# Patient Record
Sex: MALE | Race: WHITE | Employment: FULL TIME | URBAN - METROPOLITAN AREA
[De-identification: names, ages, dates, MRNs, and addresses within clinical notes are randomized per-mention and may not be internally consistent; named-entity substitution may affect disease eponyms.]

---

## 2017-02-09 ENCOUNTER — OFFICE VISIT (OUTPATIENT)
Dept: FAMILY MEDICINE CLINIC | Facility: CLINIC | Age: 32
End: 2017-02-09

## 2017-02-09 VITALS
RESPIRATION RATE: 18 BRPM | HEART RATE: 67 BPM | HEIGHT: 78 IN | OXYGEN SATURATION: 99 % | WEIGHT: 175 LBS | SYSTOLIC BLOOD PRESSURE: 110 MMHG | BODY MASS INDEX: 20.25 KG/M2 | TEMPERATURE: 98.2 F | DIASTOLIC BLOOD PRESSURE: 65 MMHG

## 2017-02-09 DIAGNOSIS — J02.9 SORE THROAT: ICD-10-CM

## 2017-02-09 DIAGNOSIS — Z20.818 STREP THROAT EXPOSURE: Primary | ICD-10-CM

## 2017-02-09 LAB
S PYO AG THROAT QL: NEGATIVE
VALID INTERNAL CONTROL?: YES

## 2017-02-09 RX ORDER — AMOXICILLIN 500 MG/1
500 CAPSULE ORAL 2 TIMES DAILY
Qty: 20 CAP | Refills: 0 | Status: SHIPPED | OUTPATIENT
Start: 2017-02-09 | End: 2017-02-19

## 2017-02-09 NOTE — MR AVS SNAPSHOT
Visit Information Date & Time Provider Department Dept. Phone Encounter #  
 2/9/2017  8:45 AM Timur Denise NP [facility] [address] [phone] [id number] Upcoming Health Maintenance Date Due DTaP/Tdap/Td series (1 - Tdap) 9/27/2004 INFLUENZA AGE 9 TO ADULT 8/1/2016 Allergies as of 2/9/2017  Review Complete On: 2/9/2017 By: Timur Denise NP No Known Allergies Current Immunizations  Never Reviewed No immunizations on file. Not reviewed this visit You Were Diagnosed With   
  
 Codes Comments Strep throat exposure    -  Primary ICD-10-CM: Q86.893 ICD-9-CM: V01.89 Sore throat     ICD-10-CM: J02.9 ICD-9-CM: 615 Vitals BP Pulse Temp Resp Height(growth percentile) Weight(growth percentile) 110/65 (BP 1 Location: Left arm, BP Patient Position: Sitting) 67 98.2 °F (36.8 °C) (Oral) 18 6' 6\" (1.981 m) 175 lb (79.4 kg) SpO2 BMI Smoking Status 99% 20.22 kg/m2 Passive Smoke Exposure - Never Smoker BMI and BSA Data Body Mass Index Body Surface Area  
 20.22 kg/m 2 2.09 m 2 Preferred Pharmacy Pharmacy Name Phone [id number]  [facility], [address] [place] [address]  [phone] Your Updated Medication List  
  
   
This list is accurate as of: 2/9/17  9:02 AM.  Always use your most recent med list.  
  
  
  
  
 amoxicillin 500 mg capsule Commonly known as:  AMOXIL Take 1 Cap by mouth two (2) times a day for 10 days. Prescriptions Sent to Pharmacy Refills  
 amoxicillin (AMOXIL) 500 mg capsule 0 Sig: Take 1 Cap by mouth two (2) times a day for 10 days. Class: Normal  
 Pharmacy: [id number], [address] [place] [address]  Ph #: [phone] Route: Oral  
  
We Performed the Following AMB POC RAPID STREP A [93904 CPT(R)] Patient Instructions Sore Throat: Care Instructions Your Care Instructions Infection by bacteria or a virus causes most sore throats. Cigarette smoke, dry air, air pollution, allergies, and yelling can also cause a sore throat. Sore throats can be painful and annoying. Fortunately, most sore throats go away on their own. If you have a bacterial infection, your doctor may prescribe antibiotics. Follow-up care is a key part of your treatment and safety. Be sure to make and go to all appointments, and call your doctor if you are having problems. It's also a good idea to know your test results and keep a list of the medicines you take. How can you care for yourself at home? · If your doctor prescribed antibiotics, take them as directed. Do not stop taking them just because you feel better. You need to take the full course of antibiotics. · Gargle with warm salt water once an hour to help reduce swelling and relieve discomfort. Use 1 teaspoon of salt mixed in 1 cup of warm water. · Take an over-the-counter pain medicine, such as acetaminophen (Tylenol), ibuprofen (Advil, Motrin), or naproxen (Aleve). Read and follow all instructions on the label. · Be careful when taking over-the-counter cold or flu medicines and Tylenol at the same time. Many of these medicines have acetaminophen, which is Tylenol. Read the labels to make sure that you are not taking more than the recommended dose. Too much acetaminophen (Tylenol) can be harmful. · Drink plenty of fluids. Fluids may help soothe an irritated throat. Hot fluids, such as tea or soup, may help decrease throat pain. · Use over-the-counter throat lozenges to soothe pain. Regular cough drops or hard candy may also help. These should not be given to young children because of the risk of choking. · Do not smoke or allow others to smoke around you. If you need help quitting, talk to your doctor about stop-smoking programs and medicines. These can increase your chances of quitting for good. · Use a vaporizer or humidifier to add moisture to your bedroom. Follow the directions for cleaning the machine. When should you call for help? Call your doctor now or seek immediate medical care if: 
· You have new or worse trouble swallowing. · Your sore throat gets much worse on one side. Watch closely for changes in your health, and be sure to contact your doctor if you do not get better as expected. Where can you learn more? Go to http://nicole-prasanth.info/. Enter [id number] in the search box to learn more about \"Sore Throat: Care Instructions. \" Current as of: July 29, 2016 Content Version: 11.1 © 3941-3714 UserApp. Care instructions adapted under license by Thefuture[employer]fm (which disclaims liability or warranty for this information). If you have questions about a medical condition or this instruction, always ask your healthcare professional. [place] [address] any warranty or liability for your use of this information. Sore Throat: Care Instructions Your Care Instructions Infection by bacteria or a virus causes most sore throats. Cigarette smoke, dry air, air pollution, allergies, and yelling can also cause a sore throat. Sore throats can be painful and annoying. Fortunately, most sore throats go away on their own. If you have a bacterial infection, your doctor may prescribe antibiotics. Follow-up care is a key part of your treatment and safety. Be sure to make and go to all appointments, and call your doctor if you are having problems. It's also a good idea to know your test results and keep a list of the medicines you take. How can you care for yourself at home? · If your doctor prescribed antibiotics, take them as directed. Do not stop taking them just because you feel better. You need to take the full course of antibiotics.  
· Gargle with warm salt water once an hour to help reduce swelling and relieve discomfort. Use 1 teaspoon of salt mixed in 1 cup of warm water. · Take an over-the-counter pain medicine, such as acetaminophen (Tylenol), ibuprofen (Advil, Motrin), or naproxen (Aleve). Read and follow all instructions on the label. · Be careful when taking over-the-counter cold or flu medicines and Tylenol at the same time. Many of these medicines have acetaminophen, which is Tylenol. Read the labels to make sure that you are not taking more than the recommended dose. Too much acetaminophen (Tylenol) can be harmful. · Drink plenty of fluids. Fluids may help soothe an irritated throat. Hot fluids, such as tea or soup, may help decrease throat pain. · Use over-the-counter throat lozenges to soothe pain. Regular cough drops or hard candy may also help. These should not be given to young children because of the risk of choking. · Do not smoke or allow others to smoke around you. If you need help quitting, talk to your doctor about stop-smoking programs and medicines. These can increase your chances of quitting for good. · Use a vaporizer or humidifier to add moisture to your bedroom. Follow the directions for cleaning the machine. When should you call for help? Call your doctor now or seek immediate medical care if: 
· You have new or worse trouble swallowing. · Your sore throat gets much worse on one side. Watch closely for changes in your health, and be sure to contact your doctor if you do not get better as expected. Where can you learn more? Go to http://nicole-prasanth.info/. Enter [id number] in the search box to learn more about \"Sore Throat: Care Instructions. \" Current as of: July 29, 2016 Content Version: 11.1 © 9288-9162 Paltalk. Care instructions adapted under license by [employer] (which disclaims liability or warranty for this information).  If you have questions about a medical condition or this instruction, always ask your healthcare professional. [address] any warranty or liability for your use of this information. Rapid Strep Test: About This Test 
What is it? A rapid strep test checks the bacteria in your throat to see if strep is the cause of your sore throat. Why is this test done? It may be done so your doctor can find out right away whether you have strep throat. There is another test for strep, called a throat culture, but that test takes a few days to get the results. How can you prepare for the test? 
You don't need to do anything before you have this test. 
What happens during the test? 
· You will be asked to tilt your head back and open your mouth as wide as possible. · Your doctor will press your tongue down with a flat stick (tongue depressor) and then examine your mouth and throat. · A clean cotton swab will be rubbed over the back of your throat, around your tonsils, and over any red areas or sores to collect a sample. How long does the test take? · The test takes less than a minute. · Results are available in 10 to 15 minutes. When should you call for help? Call your doctor now or seek immediate medical care if: 
· Your pain gets worse on one side of your throat, or you have trouble opening your mouth. · You have a new or higher fever, or you have a fever with a stiff neck or severe headache. · Swallowing becomes harder, or you have any trouble breathing. · You are sensitive to light or feel very sleepy or confused. Watch closely for changes in your health, and be sure to contact your doctor if: 
· You do not start to feel better after 2 days. Follow-up care is a key part of your treatment and safety. Be sure to make and go to all appointments, and call your doctor if you are having problems. It's also a good idea to keep a list of the medicines you take. Ask your doctor when you can expect to have your test results. Where can you learn more? Go to http://nicole-prasanth.info/. Enter B356 in the search box to learn more about \"Rapid Strep Test: About This Test.\" Current as of: July 29, 2016 Content Version: 11.1 © 4001-3591 untapt, Incorporated. Care instructions adapted under license by [employer] (which disclaims liability or warranty for this information). If you have questions about a medical condition or this instruction, always ask your healthcare professional. [address] any warranty or liability for your use of this information. Introducing [facility]! Dear Ela Shanks: 
Thank you for requesting a [employer] account. Our records indicate that you already have an active [employer] account. You can access your account anytime at [email or web address] Did you know that you can access your hospital and ER discharge instructions at any time in [employer]? You can also review all of your test results from your hospital stay or ER visit. Additional Information If you have questions, please visit the Frequently Asked Questions section of the [employer] website at [email or web address] Remember, [employer] is NOT to be used for urgent needs. For medical emergencies, dial 911. Now available from your iPhone and Android! Please provide this summary of care documentation to your next provider. Your primary care clinician is listed as Golden Stern. If you have any questions after today's visit, please call [phone].

## 2017-02-09 NOTE — PROGRESS NOTES
Chief Complaint   Patient presents with    Sore Throat     sore throat for one day. Daughter is being treatedfor strep throat. Subjective fective. HISTORY OF PRESENT ILLNESS  Vincenzo Marshall is a 33 y.o. male who presents with sore throat for one day and exposure to sore throat . Sore Throat    The history is provided by the patient. This is a new problem. The current episode started yesterday. The problem has been gradually worsening. Patient reports a subjective fever - was not measured. Pertinent negatives include no congestion, no ear discharge, no ear pain, no headaches, no plugged ear sensation, no shortness of breath, no swollen glands, no trouble swallowing and no cough. He has had exposure to strep. He has had no exposure to mono. He has tried nothing for the symptoms. Review of Systems   Constitutional: Negative for chills and fever. HENT: Positive for sore throat. Negative for congestion, ear discharge, ear pain and trouble swallowing. Eyes: Negative for discharge and redness. Respiratory: Negative for cough and shortness of breath. Cardiovascular: Negative for chest pain. Neurological: Negative for headaches. Past Medical History   Diagnosis Date    Clavicle fracture     Insomnia     Pain, abdominal, nonspecific      Dr. Ki Pacheco.  recurrent pain, diarrhea, nausea.    colon 6/27/12    Seasonal allergies      hx allergy shot    Vertigo      Family History   Problem Relation Age of Onset    Crohn's Disease Brother     Cancer Neg Hx     Diabetes Neg Hx     Heart Disease Neg Hx     High Cholesterol Neg Hx     Stroke Neg Hx      Social History     Social History    Marital status: SINGLE     Spouse name:  Srini Owens    Number of children: N/A    Years of education: N/A     Occupational History    [employer]      Social History Main Topics    Smoking status: Passive Smoke Exposure - Never Smoker     Types: Cigarettes    Smokeless tobacco: Never Used Comment: Typically 2 cigarettes    Alcohol use 5.0 oz/week     10 Standard drinks or equivalent per week      Comment: occasionally    Drug use: Not on file    Sexual activity: Yes     Partners: Female     Birth control/ protection: Pill     Other Topics Concern    Not on file     Social History Narrative       Current Outpatient Prescriptions:     amoxicillin (AMOXIL) 500 mg capsule, Take 1 Cap by mouth two (2) times a day for 10 days. , Disp: 20 Cap, Rfl: 0    Objective:   Visit Vitals    /65 (BP 1 Location: Left arm, BP Patient Position: Sitting)    Pulse 67    Temp 98.2 °F (36.8 °C) (Oral)    Resp 18    Ht 6' 6\" (1.981 m)    Wt 175 lb (79.4 kg)    SpO2 99%    BMI 20.22 kg/m2     Physical Exam   Constitutional: He is oriented to person, place, and time. He appears well-developed and well-nourished. HENT:   Head: Normocephalic and atraumatic. Right Ear: External ear normal.   Left Ear: External ear normal.   Nose: Nose normal.   Mouth/Throat: Oropharynx is clear and moist. No oropharyngeal exudate. Eyes: Conjunctivae are normal.   Cardiovascular: Normal rate, regular rhythm and normal heart sounds. Pulmonary/Chest: Effort normal and breath sounds normal. No respiratory distress. Lymphadenopathy:     He has no cervical adenopathy. Neurological: He is alert and oriented to person, place, and time. Skin: Skin is warm and dry. Psychiatric: He has a normal mood and affect. ASSESSMENT and PLAN    ICD-10-CM ICD-9-CM    1. Strep throat exposure Z20.818 V01.89 AMB POC RAPID STREP A      amoxicillin (AMOXIL) 500 mg capsule   2. Sore throat J02.9 462 AMB POC RAPID STREP A      amoxicillin (AMOXIL) 500 mg capsule     Lab results discussed and reviewed with patient. Negative rapid strep test. Patient instructed to monitor symptoms and hold antibiotic unless orif symptoms worsen due to exposure to strep.   Reviewed medications, s/s allergic reaction and side effects in detail. Instructed warm salt water gargles and throat lozenges. Advised if symptoms do not improve after start of antibiotics or within 5-7 days to f/u with PCP. After visit summary discussed with and given to patient who verbalized understanding and was given the opportunity to ask questions.

## 2017-02-09 NOTE — PATIENT INSTRUCTIONS
Sore Throat: Care Instructions  Your Care Instructions    Infection by bacteria or a virus causes most sore throats. Cigarette smoke, dry air, air pollution, allergies, and yelling can also cause a sore throat. Sore throats can be painful and annoying. Fortunately, most sore throats go away on their own. If you have a bacterial infection, your doctor may prescribe antibiotics. Follow-up care is a key part of your treatment and safety. Be sure to make and go to all appointments, and call your doctor if you are having problems. It's also a good idea to know your test results and keep a list of the medicines you take. How can you care for yourself at home? · If your doctor prescribed antibiotics, take them as directed. Do not stop taking them just because you feel better. You need to take the full course of antibiotics. · Gargle with warm salt water once an hour to help reduce swelling and relieve discomfort. Use 1 teaspoon of salt mixed in 1 cup of warm water. · Take an over-the-counter pain medicine, such as acetaminophen (Tylenol), ibuprofen (Advil, Motrin), or naproxen (Aleve). Read and follow all instructions on the label. · Be careful when taking over-the-counter cold or flu medicines and Tylenol at the same time. Many of these medicines have acetaminophen, which is Tylenol. Read the labels to make sure that you are not taking more than the recommended dose. Too much acetaminophen (Tylenol) can be harmful. · Drink plenty of fluids. Fluids may help soothe an irritated throat. Hot fluids, such as tea or soup, may help decrease throat pain. · Use over-the-counter throat lozenges to soothe pain. Regular cough drops or hard candy may also help. These should not be given to young children because of the risk of choking. · Do not smoke or allow others to smoke around you. If you need help quitting, talk to your doctor about stop-smoking programs and medicines.  These can increase your chances of quitting for good. · Use a vaporizer or humidifier to add moisture to your bedroom. Follow the directions for cleaning the machine. When should you call for help? Call your doctor now or seek immediate medical care if:  · You have new or worse trouble swallowing. · Your sore throat gets much worse on one side. Watch closely for changes in your health, and be sure to contact your doctor if you do not get better as expected. Where can you learn more? Go to http://nicole-prasanth.info/. Enter [id number] in the search box to learn more about \"Sore Throat: Care Instructions. \"  Current as of: July 29, 2016  Content Version: 11.1  © 8969-4569 Bookacoach. Care instructions adapted under license by BlockScore (which disclaims liability or warranty for this information). If you have questions about a medical condition or this instruction, always ask your healthcare professional. [place] [address] any warranty or liability for your use of this information. Sore Throat: Care Instructions  Your Care Instructions    Infection by bacteria or a virus causes most sore throats. Cigarette smoke, dry air, air pollution, allergies, and yelling can also cause a sore throat. Sore throats can be painful and annoying. Fortunately, most sore throats go away on their own. If you have a bacterial infection, your doctor may prescribe antibiotics. Follow-up care is a key part of your treatment and safety. Be sure to make and go to all appointments, and call your doctor if you are having problems. It's also a good idea to know your test results and keep a list of the medicines you take. How can you care for yourself at home? · If your doctor prescribed antibiotics, take them as directed. Do not stop taking them just because you feel better. You need to take the full course of antibiotics. · Gargle with warm salt water once an hour to help reduce swelling and relieve discomfort.  Use 1 teaspoon of salt mixed in 1 cup of warm water. · Take an over-the-counter pain medicine, such as acetaminophen (Tylenol), ibuprofen (Advil, Motrin), or naproxen (Aleve). Read and follow all instructions on the label. · Be careful when taking over-the-counter cold or flu medicines and Tylenol at the same time. Many of these medicines have acetaminophen, which is Tylenol. Read the labels to make sure that you are not taking more than the recommended dose. Too much acetaminophen (Tylenol) can be harmful. · Drink plenty of fluids. Fluids may help soothe an irritated throat. Hot fluids, such as tea or soup, may help decrease throat pain. · Use over-the-counter throat lozenges to soothe pain. Regular cough drops or hard candy may also help. These should not be given to young children because of the risk of choking. · Do not smoke or allow others to smoke around you. If you need help quitting, talk to your doctor about stop-smoking programs and medicines. These can increase your chances of quitting for good. · Use a vaporizer or humidifier to add moisture to your bedroom. Follow the directions for cleaning the machine. When should you call for help? Call your doctor now or seek immediate medical care if:  · You have new or worse trouble swallowing. · Your sore throat gets much worse on one side. Watch closely for changes in your health, and be sure to contact your doctor if you do not get better as expected. Where can you learn more? Go to http://nicole-prasanth.info/. Enter [id number] in the search box to learn more about \"Sore Throat: Care Instructions. \"  Current as of: July 29, 2016  Content Version: 11.1  © 4960-5471 TRAKLOK. Care instructions adapted under license by [employer] (which disclaims liability or warranty for this information).  If you have questions about a medical condition or this instruction, always ask your healthcare professional. [employer] disclaims any warranty or liability for your use of this information. Rapid Strep Test: About This Test  What is it? A rapid strep test checks the bacteria in your throat to see if strep is the cause of your sore throat. Why is this test done? It may be done so your doctor can find out right away whether you have strep throat. There is another test for strep, called a throat culture, but that test takes a few days to get the results. How can you prepare for the test?  You don't need to do anything before you have this test.  What happens during the test?  · You will be asked to tilt your head back and open your mouth as wide as possible. · Your doctor will press your tongue down with a flat stick (tongue depressor) and then examine your mouth and throat. · A clean cotton swab will be rubbed over the back of your throat, around your tonsils, and over any red areas or sores to collect a sample. How long does the test take? · The test takes less than a minute. · Results are available in 10 to 15 minutes. When should you call for help? Call your doctor now or seek immediate medical care if:  · Your pain gets worse on one side of your throat, or you have trouble opening your mouth. · You have a new or higher fever, or you have a fever with a stiff neck or severe headache. · Swallowing becomes harder, or you have any trouble breathing. · You are sensitive to light or feel very sleepy or confused. Watch closely for changes in your health, and be sure to contact your doctor if:  · You do not start to feel better after 2 days. Follow-up care is a key part of your treatment and safety. Be sure to make and go to all appointments, and call your doctor if you are having problems. It's also a good idea to keep a list of the medicines you take. Ask your doctor when you can expect to have your test results. Where can you learn more? Go to http://nicole-prasanth.info/.   Enter B356 in the search box to learn more about \"Rapid Strep Test: About This Test.\"  Current as of: July 29, 2016  Content Version: 11.1  © 9085-5507 [employer] Partners[employer] Inc[employer]. Care instructions adapted under license by [employer] (which disclaims liability or warranty for this information). If you have questions about a medical condition or this instruction, always ask your healthcare professional. [place] [address] any warranty or liability for your use of this information.

## 2018-05-24 ENCOUNTER — APPOINTMENT (OUTPATIENT)
Dept: CT IMAGING | Age: 33
DRG: 390 | End: 2018-05-24
Attending: EMERGENCY MEDICINE
Payer: COMMERCIAL

## 2018-05-24 ENCOUNTER — HOSPITAL ENCOUNTER (INPATIENT)
Age: 33
LOS: 4 days | Discharge: HOME | DRG: 390 | End: 2018-05-29
Attending: EMERGENCY MEDICINE | Admitting: SURGERY
Payer: COMMERCIAL

## 2018-05-24 DIAGNOSIS — K56.609 SBO (SMALL BOWEL OBSTRUCTION) (HCC): Primary | ICD-10-CM

## 2018-05-24 DIAGNOSIS — R10.9 RECURRENT ABDOMINAL PAIN: ICD-10-CM

## 2018-05-24 LAB
ALBUMIN SERPL-MCNC: 4.3 G/DL (ref 3.5–5)
ALBUMIN/GLOB SERPL: 1 {RATIO} (ref 1.1–2.2)
ALP SERPL-CCNC: 54 U/L (ref 45–117)
ALT SERPL-CCNC: 33 U/L (ref 12–78)
ANION GAP SERPL CALC-SCNC: 11 MMOL/L (ref 5–15)
AST SERPL-CCNC: 23 U/L (ref 15–37)
BASOPHILS # BLD: 0.1 K/UL (ref 0–0.1)
BASOPHILS NFR BLD: 0 % (ref 0–1)
BILIRUB SERPL-MCNC: 0.6 MG/DL (ref 0.2–1)
BUN SERPL-MCNC: 10 MG/DL (ref 6–20)
BUN/CREAT SERPL: 11 (ref 12–20)
CALCIUM SERPL-MCNC: 9.6 MG/DL (ref 8.5–10.1)
CHLORIDE SERPL-SCNC: 103 MMOL/L (ref 97–108)
CO2 SERPL-SCNC: 28 MMOL/L (ref 21–32)
CREAT SERPL-MCNC: 0.89 MG/DL (ref 0.7–1.3)
DIFFERENTIAL METHOD BLD: ABNORMAL
EOSINOPHIL # BLD: 0.1 K/UL (ref 0–0.4)
EOSINOPHIL NFR BLD: 1 % (ref 0–7)
ERYTHROCYTE [DISTWIDTH] IN BLOOD BY AUTOMATED COUNT: 11.8 % (ref 11.5–14.5)
GLOBULIN SER CALC-MCNC: 4.1 G/DL (ref 2–4)
GLUCOSE SERPL-MCNC: 112 MG/DL (ref 65–100)
HCT VFR BLD AUTO: 51.9 % (ref 36.6–50.3)
HGB BLD-MCNC: 17.9 G/DL (ref 12.1–17)
IMM GRANULOCYTES # BLD: 0 K/UL (ref 0–0.04)
IMM GRANULOCYTES NFR BLD AUTO: 0 % (ref 0–0.5)
LACTATE SERPL-SCNC: 2.8 MMOL/L (ref 0.4–2)
LIPASE SERPL-CCNC: 156 U/L (ref 73–393)
LYMPHOCYTES # BLD: 2.9 K/UL (ref 0.8–3.5)
LYMPHOCYTES NFR BLD: 24 % (ref 12–49)
MCH RBC QN AUTO: 31 PG (ref 26–34)
MCHC RBC AUTO-ENTMCNC: 34.5 G/DL (ref 30–36.5)
MCV RBC AUTO: 89.8 FL (ref 80–99)
MONOCYTES # BLD: 1 K/UL (ref 0–1)
MONOCYTES NFR BLD: 9 % (ref 5–13)
NEUTS SEG # BLD: 7.9 K/UL (ref 1.8–8)
NEUTS SEG NFR BLD: 66 % (ref 32–75)
NRBC # BLD: 0 K/UL (ref 0–0.01)
NRBC BLD-RTO: 0 PER 100 WBC
PLATELET # BLD AUTO: 261 K/UL (ref 150–400)
PMV BLD AUTO: 10.3 FL (ref 8.9–12.9)
POTASSIUM SERPL-SCNC: 3.6 MMOL/L (ref 3.5–5.1)
PROT SERPL-MCNC: 8.4 G/DL (ref 6.4–8.2)
RBC # BLD AUTO: 5.78 M/UL (ref 4.1–5.7)
SODIUM SERPL-SCNC: 142 MMOL/L (ref 136–145)
WBC # BLD AUTO: 12 K/UL (ref 4.1–11.1)

## 2018-05-24 PROCEDURE — 96375 TX/PRO/DX INJ NEW DRUG ADDON: CPT

## 2018-05-24 PROCEDURE — 0D9670Z DRAINAGE OF STOMACH WITH DRAINAGE DEVICE, VIA NATURAL OR ARTIFICIAL OPENING: ICD-10-PCS | Performed by: SURGERY

## 2018-05-24 PROCEDURE — 99284 EMERGENCY DEPT VISIT MOD MDM: CPT

## 2018-05-24 PROCEDURE — 80053 COMPREHEN METABOLIC PANEL: CPT | Performed by: EMERGENCY MEDICINE

## 2018-05-24 PROCEDURE — 83690 ASSAY OF LIPASE: CPT | Performed by: EMERGENCY MEDICINE

## 2018-05-24 PROCEDURE — 81001 URINALYSIS AUTO W/SCOPE: CPT | Performed by: EMERGENCY MEDICINE

## 2018-05-24 PROCEDURE — 96376 TX/PRO/DX INJ SAME DRUG ADON: CPT

## 2018-05-24 PROCEDURE — 36415 COLL VENOUS BLD VENIPUNCTURE: CPT | Performed by: EMERGENCY MEDICINE

## 2018-05-24 PROCEDURE — 96361 HYDRATE IV INFUSION ADD-ON: CPT

## 2018-05-24 PROCEDURE — 83605 ASSAY OF LACTIC ACID: CPT | Performed by: EMERGENCY MEDICINE

## 2018-05-24 PROCEDURE — 74011636320 HC RX REV CODE- 636/320: Performed by: EMERGENCY MEDICINE

## 2018-05-24 PROCEDURE — 74177 CT ABD & PELVIS W/CONTRAST: CPT

## 2018-05-24 PROCEDURE — 85025 COMPLETE CBC W/AUTO DIFF WBC: CPT | Performed by: EMERGENCY MEDICINE

## 2018-05-24 PROCEDURE — 74011250636 HC RX REV CODE- 250/636: Performed by: EMERGENCY MEDICINE

## 2018-05-24 PROCEDURE — 96374 THER/PROPH/DIAG INJ IV PUSH: CPT

## 2018-05-24 RX ORDER — ONDANSETRON 2 MG/ML
4 INJECTION INTRAMUSCULAR; INTRAVENOUS
Status: COMPLETED | OUTPATIENT
Start: 2018-05-24 | End: 2018-05-24

## 2018-05-24 RX ORDER — HYDROMORPHONE HYDROCHLORIDE 2 MG/ML
1 INJECTION, SOLUTION INTRAMUSCULAR; INTRAVENOUS; SUBCUTANEOUS
Status: COMPLETED | OUTPATIENT
Start: 2018-05-24 | End: 2018-05-24

## 2018-05-24 RX ADMIN — HYDROMORPHONE HYDROCHLORIDE: 2 INJECTION INTRAMUSCULAR; INTRAVENOUS; SUBCUTANEOUS at 23:03

## 2018-05-24 RX ADMIN — ONDANSETRON 4 MG: 2 INJECTION INTRAMUSCULAR; INTRAVENOUS at 20:31

## 2018-05-24 RX ADMIN — IOPAMIDOL 97 ML: 755 INJECTION, SOLUTION INTRAVENOUS at 23:12

## 2018-05-24 RX ADMIN — HYDROMORPHONE HYDROCHLORIDE 1 MG: 2 INJECTION INTRAMUSCULAR; INTRAVENOUS; SUBCUTANEOUS at 20:31

## 2018-05-24 RX ADMIN — SODIUM CHLORIDE 1000 ML: 900 INJECTION, SOLUTION INTRAVENOUS at 20:32

## 2018-05-24 NOTE — IP AVS SNAPSHOT
[address] [facility] 
 
 
 [address] [facility] 
[phone] Patient: Cori Moss MRN: [id number] EBF: About your hospitalization You were admitted on:  May 25, 2018 You last received care in the:  [facility] 4M POST SURG ORT 1 You were discharged on:  May 29, 2018 Why you were hospitalized Your primary diagnosis was:  Not on File Your diagnoses also included:  Small Bowel Obstruction (Hcc) Follow-up Information Follow up With Details Comments Contact Info Maricruz Watters, [phone] [facility] 
[phone] Darline Joe MD   [facility] [address] [facility] [address] 
[phone] Discharge Orders None A check katya indicates which time of day the medication should be taken. My Medications Notice You have not been prescribed any medications. Discharge Instructions Patient Discharge Instructions Cori Moss / [id number] : 1985 Admitted 2018 Discharged: 2018 Take Home Medications · It is important that you take the medication exactly as they are prescribed. · Keep your medication in the bottles provided by the pharmacist and keep a list of the medication names, dosages, and times to be taken in your wallet. · Do not take other medications without consulting your doctor. What to do at [facility] Recommended diet: Resume previous diet Recommended activity: As tolerated Follow up with Dr. Tiny Wisdom as needed. Follow up with Dr. Barak Spivey. Call Dr. Tiny Wisdom or go to the ER if you develop worsening pain, fever, vomiting, or any other symptoms that concern you. Introducing [facility] [place] [facility]! Dear Alana Saini: 
Thank you for requesting a [email or web address] account.   Our records indicate that you already have an active [email or web address] account. You can access your account anytime at [email or web address] Did you know that you can access your hospital and ER discharge instructions at any time in [email or web address]? You can also review all of your test results from your hospital stay or ER visit. Additional Information If you have questions, please visit the Frequently Asked Questions section of the [email or web address] website at [email or web address] Remember, [email or web address] is NOT to be used for urgent needs. For medical emergencies, dial 911. Now available from your iPhone and Android! Introducing Daniel Schaefer As a Muralice HodgeManolo patient, I wanted to make you aware of our electronic visit tool called Daniel Schaefer. [employer] Manolo 24/SonoPlot allows you to connect within minutes with a medical provider 24 hours a day, seven days a week via a mobile device or tablet or logging into a secure website from your computer. You can access Daniel Schaefer from anywhere in the [place]. A virtual visit might be right for you when you have a simple condition and feel like you just dont want to get out of bed, or cant get away from work for an appointment, when your regular [employer] Manolo provider is not available (evenings, weekends or holidays), or when youre out of town and need minor care. Electronic visits cost only $49 and if the 500Indies/7 provider determines a prescription is needed to treat your condition, one can be electronically transmitted to a nearby pharmacy*. Please take a moment to enroll today if you have not already done so. The enrollment process is free and takes just a few minutes. To enroll, please download the 500Indies/SonoPlot akhil to your tablet or phone, or visit [email or web address] to enroll on your computer.    
And, as an [address] [place] [address] patient with a [employer] account, the results of your visits will be scanned into your electronic medical record and your primary care provider will be able to view the scanned results. We urge you to continue to see your regular [employer] provider for your ongoing medical care. And while your primary care provider may not be the one available when you seek a Daniel Fajardofin virtual visit, the peace of mind you get from getting a real diagnosis real time can be priceless. For more information on Daniel Li Creative Technologiesallyssafin, view our Frequently Asked Questions (FAQs) at [email or web address]. Sincerely, 
 
Beto Garrido MD 
Chief Medical Officer [address] *:  certain medications cannot be prescribed via PillGuard Providers Seen During Your Hospitalization Provider Specialty Primary office phone Ida Lindsay MD Emergency Medicine [phone] Aayush Vale MD General Surgery [phone] Your Primary Care Physician (PCP) Primary Care Physician Office Phone Office Fax Shukri Odonnell [phone] You are allergic to the following No active allergies Recent Documentation Height Weight BMI Smoking Status 1.93 m 83 kg 22.28 kg/m2 Passive Smoke Exposure - Never Smoker Emergency Contacts Name Discharge Info Relation Home Work Mobile Bright.md 15 CAREGIVER [3] Parent [1] [phone] Patient Belongings The following personal items are in your possession at time of discharge: 
  Dental Appliances: None  Visual Aid: None      Home Medications: None   Jewelry: None  Clothing: At bedside, Shirt, Shorts, Slippers, Footwear    Other Valuables: [employer] Please provide this summary of care documentation to your next provider. Signatures-by signing, you are acknowledging that this After Visit Summary has been reviewed with you and you have received a copy. Patient Signature:  ____________________________________________________________ Date:  ____________________________________________________________  
  
Arna [place] Provider Signature:  ____________________________________________________________ Date:  ____________________________________________________________

## 2018-05-24 NOTE — IP AVS SNAPSHOT
[address] [place] [address] 
[phone] Patient: Leslie Harmon MRN: [id number] [id number] A check katya indicates which time of day the medication should be taken. My Medications Notice You have not been prescribed any medications.

## 2018-05-25 PROBLEM — K56.609 SMALL BOWEL OBSTRUCTION (HCC): Status: ACTIVE | Noted: 2018-05-25

## 2018-05-25 LAB
ANION GAP SERPL CALC-SCNC: 7 MMOL/L (ref 5–15)
APPEARANCE UR: CLEAR
BACTERIA URNS QL MICRO: NEGATIVE /HPF
BILIRUB UR QL: NEGATIVE
BUN SERPL-MCNC: 9 MG/DL (ref 6–20)
BUN/CREAT SERPL: 13 (ref 12–20)
CALCIUM SERPL-MCNC: 8.6 MG/DL (ref 8.5–10.1)
CHLORIDE SERPL-SCNC: 105 MMOL/L (ref 97–108)
CO2 SERPL-SCNC: 26 MMOL/L (ref 21–32)
COLOR UR: ABNORMAL
CREAT SERPL-MCNC: 0.68 MG/DL (ref 0.7–1.3)
EPITH CASTS URNS QL MICRO: ABNORMAL /LPF
ERYTHROCYTE [DISTWIDTH] IN BLOOD BY AUTOMATED COUNT: 11.7 % (ref 11.5–14.5)
GLUCOSE SERPL-MCNC: 135 MG/DL (ref 65–100)
GLUCOSE UR STRIP.AUTO-MCNC: NEGATIVE MG/DL
HCT VFR BLD AUTO: 44.5 % (ref 36.6–50.3)
HGB BLD-MCNC: 15.1 G/DL (ref 12.1–17)
HGB UR QL STRIP: NEGATIVE
HYALINE CASTS URNS QL MICRO: ABNORMAL /LPF (ref 0–5)
KETONES UR QL STRIP.AUTO: 40 MG/DL
LEUKOCYTE ESTERASE UR QL STRIP.AUTO: NEGATIVE
MAGNESIUM SERPL-MCNC: 1.9 MG/DL (ref 1.6–2.4)
MCH RBC QN AUTO: 30.7 PG (ref 26–34)
MCHC RBC AUTO-ENTMCNC: 33.9 G/DL (ref 30–36.5)
MCV RBC AUTO: 90.4 FL (ref 80–99)
NITRITE UR QL STRIP.AUTO: NEGATIVE
NRBC # BLD: 0 K/UL (ref 0–0.01)
NRBC BLD-RTO: 0 PER 100 WBC
PH UR STRIP: 5.5 [PH] (ref 5–8)
PLATELET # BLD AUTO: 204 K/UL (ref 150–400)
PMV BLD AUTO: 10.4 FL (ref 8.9–12.9)
POTASSIUM SERPL-SCNC: 3.8 MMOL/L (ref 3.5–5.1)
PROT UR STRIP-MCNC: NEGATIVE MG/DL
RBC # BLD AUTO: 4.92 M/UL (ref 4.1–5.7)
RBC #/AREA URNS HPF: ABNORMAL /HPF (ref 0–5)
SODIUM SERPL-SCNC: 138 MMOL/L (ref 136–145)
SP GR UR REFRACTOMETRY: 1.01 (ref 1–1.03)
UR CULT HOLD, URHOLD: NORMAL
UROBILINOGEN UR QL STRIP.AUTO: 1 EU/DL (ref 0.2–1)
WBC # BLD AUTO: 7.7 K/UL (ref 4.1–11.1)
WBC URNS QL MICRO: ABNORMAL /HPF (ref 0–4)

## 2018-05-25 PROCEDURE — 74011250636 HC RX REV CODE- 250/636: Performed by: SURGERY

## 2018-05-25 PROCEDURE — 83735 ASSAY OF MAGNESIUM: CPT | Performed by: SURGERY

## 2018-05-25 PROCEDURE — 36415 COLL VENOUS BLD VENIPUNCTURE: CPT | Performed by: SURGERY

## 2018-05-25 PROCEDURE — 80048 BASIC METABOLIC PNL TOTAL CA: CPT | Performed by: SURGERY

## 2018-05-25 PROCEDURE — 85027 COMPLETE CBC AUTOMATED: CPT | Performed by: SURGERY

## 2018-05-25 PROCEDURE — 74011250636 HC RX REV CODE- 250/636: Performed by: PHYSICIAN ASSISTANT

## 2018-05-25 PROCEDURE — 65270000029 HC RM PRIVATE

## 2018-05-25 RX ORDER — SODIUM CHLORIDE 0.9 % (FLUSH) 0.9 %
5-10 SYRINGE (ML) INJECTION AS NEEDED
Status: DISCONTINUED | OUTPATIENT
Start: 2018-05-25 | End: 2018-05-29 | Stop reason: HOSPADM

## 2018-05-25 RX ORDER — DEXTROSE, SODIUM CHLORIDE, AND POTASSIUM CHLORIDE 5; .9; .15 G/100ML; G/100ML; G/100ML
50 INJECTION INTRAVENOUS CONTINUOUS
Status: DISCONTINUED | OUTPATIENT
Start: 2018-05-25 | End: 2018-05-29

## 2018-05-25 RX ORDER — ENOXAPARIN SODIUM 100 MG/ML
40 INJECTION SUBCUTANEOUS EVERY 24 HOURS
Status: DISCONTINUED | OUTPATIENT
Start: 2018-05-25 | End: 2018-05-29 | Stop reason: HOSPADM

## 2018-05-25 RX ORDER — DIPHENHYDRAMINE HYDROCHLORIDE 50 MG/ML
12.5 INJECTION, SOLUTION INTRAMUSCULAR; INTRAVENOUS
Status: DISCONTINUED | OUTPATIENT
Start: 2018-05-25 | End: 2018-05-29 | Stop reason: HOSPADM

## 2018-05-25 RX ORDER — LIDOCAINE HYDROCHLORIDE 20 MG/ML
JELLY TOPICAL AS NEEDED
Status: DISCONTINUED | OUTPATIENT
Start: 2018-05-25 | End: 2018-05-29 | Stop reason: HOSPADM

## 2018-05-25 RX ORDER — SODIUM CHLORIDE 0.9 % (FLUSH) 0.9 %
5-10 SYRINGE (ML) INJECTION EVERY 8 HOURS
Status: DISCONTINUED | OUTPATIENT
Start: 2018-05-25 | End: 2018-05-29 | Stop reason: HOSPADM

## 2018-05-25 RX ORDER — LIDOCAINE HYDROCHLORIDE 20 MG/ML
10 SOLUTION OROPHARYNGEAL
Status: DISPENSED | OUTPATIENT
Start: 2018-05-25 | End: 2018-05-25

## 2018-05-25 RX ORDER — ONDANSETRON 2 MG/ML
4 INJECTION INTRAMUSCULAR; INTRAVENOUS
Status: DISCONTINUED | OUTPATIENT
Start: 2018-05-25 | End: 2018-05-29 | Stop reason: HOSPADM

## 2018-05-25 RX ORDER — HYDROMORPHONE HYDROCHLORIDE 2 MG/ML
0.5 INJECTION, SOLUTION INTRAMUSCULAR; INTRAVENOUS; SUBCUTANEOUS
Status: DISCONTINUED | OUTPATIENT
Start: 2018-05-25 | End: 2018-05-26

## 2018-05-25 RX ADMIN — DEXTROSE MONOHYDRATE, SODIUM CHLORIDE, AND POTASSIUM CHLORIDE 125 ML/HR: 50; 9; 1.49 INJECTION, SOLUTION INTRAVENOUS at 11:19

## 2018-05-25 RX ADMIN — DIPHENHYDRAMINE HYDROCHLORIDE 12.5 MG: 50 INJECTION, SOLUTION INTRAMUSCULAR; INTRAVENOUS at 17:30

## 2018-05-25 RX ADMIN — DIPHENHYDRAMINE HYDROCHLORIDE 12.5 MG: 50 INJECTION, SOLUTION INTRAMUSCULAR; INTRAVENOUS at 09:08

## 2018-05-25 RX ADMIN — HYDROMORPHONE HYDROCHLORIDE 0.5 MG: 2 INJECTION INTRAMUSCULAR; INTRAVENOUS; SUBCUTANEOUS at 12:40

## 2018-05-25 RX ADMIN — DEXTROSE MONOHYDRATE, SODIUM CHLORIDE, AND POTASSIUM CHLORIDE 125 ML/HR: 50; 9; 1.49 INJECTION, SOLUTION INTRAVENOUS at 19:56

## 2018-05-25 RX ADMIN — DEXTROSE MONOHYDRATE, SODIUM CHLORIDE, AND POTASSIUM CHLORIDE 125 ML/HR: 50; 9; 1.49 INJECTION, SOLUTION INTRAVENOUS at 02:49

## 2018-05-25 RX ADMIN — HYDROMORPHONE HYDROCHLORIDE 0.5 MG: 2 INJECTION INTRAMUSCULAR; INTRAVENOUS; SUBCUTANEOUS at 23:44

## 2018-05-25 RX ADMIN — ENOXAPARIN SODIUM 40 MG: 100 INJECTION SUBCUTANEOUS at 06:17

## 2018-05-25 RX ADMIN — HYDROMORPHONE HYDROCHLORIDE 0.5 MG: 2 INJECTION INTRAMUSCULAR; INTRAVENOUS; SUBCUTANEOUS at 17:30

## 2018-05-25 RX ADMIN — HYDROMORPHONE HYDROCHLORIDE 0.5 MG: 2 INJECTION INTRAMUSCULAR; INTRAVENOUS; SUBCUTANEOUS at 03:03

## 2018-05-25 RX ADMIN — HYDROMORPHONE HYDROCHLORIDE 0.5 MG: 2 INJECTION INTRAMUSCULAR; INTRAVENOUS; SUBCUTANEOUS at 08:01

## 2018-05-25 RX ADMIN — DIPHENHYDRAMINE HYDROCHLORIDE 12.5 MG: 50 INJECTION, SOLUTION INTRAMUSCULAR; INTRAVENOUS at 23:44

## 2018-05-25 NOTE — PROGRESS NOTES
[facility]: MED RECONCILIATION      Allergies: Review of patient's allergies indicates no known allergies.     Prior to Admission Medications:   None      Thank you,    Darian Rodriguez, PharmD, BCPS'

## 2018-05-25 NOTE — CONSULTS
[address] [place] [address]. [address] [place] [address] NP  [phone]                    GASTROENTEROLOGY CONSULTATION NOTE              NAME:  Aayush Wilson   :   1985   MRN:   [id number]       Referring Physician:    Dr. Dudley Mccollum Date:   2018 11:06 AM    Chief Complaint:    Abdominal Pain     History of Present Illness:    Patient is a 27 y.o. male who we were asked to see in consultation at the request of Dr. Marcelina Schofield for the above complaint. Three days ago the patient began to experience acute onset abdominal pain. The pain is located all over his abdomen and is described as a constant pressure. He rates is as 10 of 10. It is relieved by passing gas and burping. He has had an episode of nausea with vomiting. He denies bloody or black stools. No Hematemesis. He has had an ex lap in the past for these symptoms. This was done 12 years ago. His mother states that he has had 3 similar episodes of this pain and symptoms in the past since his surgery. PMH:  Past Medical History:   Diagnosis Date    Clavicle fracture     Insomnia     Pain, abdominal, nonspecific     Dr. Dante Caraballo.  recurrent pain, diarrhea, nausea.    colon 12    Seasonal allergies     hx allergy shot    Vertigo        PSH:  Past Surgical History:   Procedure Laterality Date    ABDOMEN SURGERY PROC UNLISTED      Exploratory lap    COLONOSCOPY  12    Dr. Dante Caraballo    COLONOSCOPY  06    normal, Gladis Breach       Allergies:  No Known Allergies    Home Medications:  None       Hospital Medications:  Current Facility-Administered Medications   Medication Dose Route Frequency    sodium chloride (NS) flush 5-10 mL  5-10 mL IntraVENous Q8H    sodium chloride (NS) flush 5-10 mL  5-10 mL IntraVENous PRN    HYDROmorphone (DILAUDID) injection 0.5 mg  0.5 mg IntraVENous Q4H PRN    ondansetron (ZOFRAN) injection 4 mg  4 mg IntraVENous Q6H PRN    enoxaparin (LOVENOX) injection 40 mg  40 mg SubCUTAneous Q24H    dextrose 5% - 0.9% NaCl with KCl 20 mEq/L infusion  125 mL/hr IntraVENous CONTINUOUS    lidocaine (XYLOCAINE) 2 % viscous solution 10 mL  10 mL Mouth/Throat NOW    lidocaine (XYLOCAINE) 2 % jelly   Mucous Membrane PRN    diphenhydrAMINE (BENADRYL) injection 12.5 mg  12.5 mg IntraVENous Q6H PRN       Social History:  Social History   Substance Use Topics    Smoking status: Passive Smoke Exposure - Never Smoker     Types: Cigarettes    Smokeless tobacco: Never Used      Comment: Typically 2 cigarettes    Alcohol use 5.0 oz/week     10 Standard drinks or equivalent per week      Comment: occasionally       Family History:  Family History   Problem Relation Age of Onset    Crohn's Disease Brother     Cancer Neg Hx     Diabetes Neg Hx     Heart Disease Neg Hx     High Cholesterol Neg Hx     Stroke Neg Hx        Review of Systems:  Constitutional: negative fever, negative chills, negative weight loss  Eyes:   negative visual changes  ENT:   negative sore throat, tongue or lip swelling  Respiratory:  negative cough, negative dyspnea  Cards:  negative for chest pain, palpitations, lower extremity edema  GI:   See HPI  :  negative for frequency, dysuria  Integument:  negative for rash and pruritus  Heme:  negative for easy bruising and gum/nose bleeding  Musculoskel: negative for myalgias,  back pain and muscle weakness  Neuro: negative for headaches, dizziness, vertigo  Psych:  negative for feelings of anxiety, depression     Objective:   Patient Vitals for the past 8 hrs:   BP Temp Pulse Resp SpO2   05/25/18 0724 137/79 97.6 °F (36.4 °C) (!) 56 15 99 %             EXAM:     NEURO-alert, oriented x3, affect appropriate, no focal neurological deficits   HEENT-Head: Normocephalic, no lesions, without obvious abnormality. LUNGS-clear to auscultation bilaterally    COR-regular rate and rhythym     ABD- soft, diffusely tender to palpation.  Bowel sounds normal. No masses,  no organomegaly, normal findings EXT-no edema    Skin - No rash     Data Review     Recent Labs      05/25/18   0310  05/24/18 2006   WBC  7.7  12.0*   HGB  15.1  17.9*   HCT  44.5  51.9*   PLT  204  261     Recent Labs      05/25/18   0310  05/24/18 2006   NA  138  142   K  3.8  3.6   CL  105  103   CO2  26  28   BUN  9  10   CREA  0.68*  0.89   GLU  135*  112*   CA  8.6  9.6     Recent Labs      05/24/18 2006   SGOT  23   AP  54   TP  8.4*   ALB  4.3   GLOB  4.1*   LPSE  156     No results for input(s): INR, PTP, APTT in the last 72 hours. No lab exists for component: INREXT    Patient Active Problem List   Diagnosis Code    Pain, abdominal, nonspecific R10.9    Insomnia G47.00    Small bowel obstruction ([facility] [address] K56.609       Assessment and Plan:  Small Bowel Obstruction:  Noted on CT scan. He is now passing some gas and has his NGT out.    - Ok to have ice chips  - Justina Rinaldi and I had a long discussion with him and his wife about his recurrent abdominal pain. It is possible that adhesions may be playing a role, though not entirely sure. Crohns disease runs in his family. He has had colonsocopy in the past which he reports as being negative. We have ordered inflammatory markers and stool studies for further evaluation and will consider pursuing another colonoscopy as an outpatinet. Thanks for allowing me to participate in the care of this patient.   Signed By: Rosalinda Gomes NP     5/25/2018  11:06 AM

## 2018-05-25 NOTE — ED NOTES
Pt admitted to room 414 at this time report has been called to primary Rn .  Room is ready for pt admitting

## 2018-05-25 NOTE — ED PROVIDER NOTES
HPI Comments: 27 y.o. male with past medical history significant for vertigo, insomnia, seasonal allergies, and abdominal pain who presents accompanied by his wife with chief complaint of abdominal pain. The pt c/o severe diffuse abdominal pain and tightness. He localizes the worst of his pain along the lower abdomen. The pt indicates that his abdomen feels firm. The pt notes a recent onset of nausea and 1 episode of vomiting today. The pt notes mild diarrhea without blood or mucus. The pt's wife reports that this is the pt's 4th episode of the same abdominal pain in 12 years. The pt indicates that the pain typically lasts for a few days before resolving. The pt's wife notes that the pt had a normal endoscopy and colonoscopy with his most recent episode 6 years ago, and he had a laparoscopy that was normal when he first had this pain 12 years ago. The pt's wife reports that the pt has been c/o L hand numbness and he has severe leg cramping during these flare ups of abdominal pain that makes walking difficult. Pt notes that his leg pain has improved. Wife reports that the pt has previously tested negative for Crohn's disease. The pt denies fever. There are no other acute medical concerns at this time. Family hx: Crohn's disease - brother. Social hx: passive smoker exposure  PCP: Cherry Hurtado MD    Note written by Sancho Alvarado, as dictated by Kwadwo Davenport MD 8:24 PM     The history is provided by the patient. No  was used. Past Medical History:   Diagnosis Date    Clavicle fracture     Insomnia     Pain, abdominal, nonspecific     Dr. No Desai.  recurrent pain, diarrhea, nausea.    colon 6/27/12    Seasonal allergies     hx allergy shot    Vertigo        Past Surgical History:   Procedure Laterality Date    ABDOMEN SURGERY PROC UNLISTED  2006    Exploratory lap    COLONOSCOPY  6/27/12    Dr. Kendrick Hooker COLONOSCOPY  4/27/06    mar, Elkin Champagnlillie         Family History: Problem Relation Age of Onset    Crohn's Disease Brother     Cancer Neg Hx     Diabetes Neg Hx     Heart Disease Neg Hx     High Cholesterol Neg Hx     Stroke Neg Hx        Social History     Social History    Marital status: SINGLE     Spouse name:  Vanessa Ludwig    Number of children: N/A    Years of education: N/A     Occupational History    [employer]      Social History Main Topics    Smoking status: Passive Smoke Exposure - Never Smoker     Types: Cigarettes    Smokeless tobacco: Never Used      Comment: Typically 2 cigarettes    Alcohol use 5.0 oz/week     10 Standard drinks or equivalent per week      Comment: occasionally    Drug use: Not on file    Sexual activity: Yes     Partners: Female     Birth control/ protection: Pill     Other Topics Concern    Not on file     Social History Narrative         ALLERGIES: Review of patient's allergies indicates no known allergies. Review of Systems   Constitutional: Negative for appetite change and fever. HENT: Negative for congestion, nosebleeds and sore throat. Eyes: Negative for discharge. Respiratory: Negative for cough and shortness of breath. Cardiovascular: Negative for chest pain. Gastrointestinal: Positive for abdominal pain, diarrhea, nausea and vomiting. Genitourinary: Negative for dysuria. Musculoskeletal: Positive for myalgias (leg pain). Skin: Negative for rash. Neurological: Positive for numbness (L hand numbness). Negative for weakness and headaches. Hematological: Negative for adenopathy. Psychiatric/Behavioral: Negative. All other systems reviewed and are negative. Vitals:    05/24/18 2009 05/24/18 2015 05/24/18 2030   BP: (!) 125/110 (!) 133/113    Pulse: 61     Resp: 18     Temp: 98.2 °F (36.8 °C)     SpO2: 100% 100% 100%   Weight: 81.6 kg (180 lb)     Height: 6' 4\" (1.93 m)              Physical Exam   Constitutional: He is oriented to person, place, and time.  He appears well-developed and well-nourished. Appears uncomfortable. HENT:   Head: Normocephalic and atraumatic. Mouth/Throat: Oropharynx is clear and moist.   Eyes: Conjunctivae are normal.   Neck: Normal range of motion. Neck supple. Cardiovascular: Normal rate, regular rhythm and normal heart sounds. Pulmonary/Chest: Effort normal and breath sounds normal.   Abdominal: Bowel sounds are normal. There is generalized tenderness. Abdomen is tense to palpation. Musculoskeletal: Normal range of motion. He exhibits no edema or tenderness. Neurological: He is alert and oriented to person, place, and time. Skin: Skin is warm and dry. Psychiatric: He has a normal mood and affect. His behavior is normal.   Nursing note and vitals reviewed. Note written by Sancho Santana, as dictated by Lawernce Sicard, MD 8:24 PM      [facility]      ED Course       Procedures         CONSULT NOTE:  9:34 PM Lawernce Sicard, MD spoke with Dr. Yee Orta, Consult for hematology. Given multiple episodes of recurrent abdominal pain and neurological symptoms with thorough work-up per patient, there is possibility for porphyria. Discussed available diagnostic tests and clinical findings. Dr. Yee Orta will call me back. CONSULT NOTE:  9:46 PM Lawernce Sicard, MD spoke with Dr. Yee Orta, Consult for hematology. Discussed available diagnostic tests and clinical findings. Dr. Yee Orta will order the tests for porphyria    CT scan with SBO.     CONSULT:  Dr. Daniella Reyes - will admit    A/P:  1. SBO - admit to general surgery. 2. Recurrent episodes of severe abdominal pain - The patient gave hx concerning for porphyria. However, given SBO on CT scan today, this is less likely. Labs pending.

## 2018-05-25 NOTE — H&P
Assessment:     Small bowel obstruction possibly resolving     Plan:     Admit to surgical service  NG tube placed in ED with scant output, patient passing flatus, Will remove NG tube  NPO with ice chips and ice pops  GI consult for evaluation of this chronic history of intermittent abdominal pain. Will obtain records from previous hospitalizations and diagnostic testing  Ambulate  SCDs and Lovenox for DVT chemoprophylaxis    Signed By: Betsy Weir MD  [facility]  Office:  [phone]    May 25, 2018          General Surgery H&P    Subjective:      I was asked to see Pito Rangel by Marco Antonio David MD for management of a small bowel obstruction. The patient is a 27 y.o. male who presents for evaluation of a 3 day history of severe generalized abdominal pain, distention, nausea, non-bloody/non-bilious emesis. This pain is also associated with bilateral leg numbness. He has a history of similar symptoms that started 12 years ago. He had an extensive work-up in [place], [place] at that time that included colonoscopy and diagnostic laparoscopy that failed to discern any cause for his symptoms. Since that initial episode 12 years ago he has had 4 other episodes of similar pain that required hospitalization, most recently at [facility] . His symptoms resolved in a few days and he was discharged home. He has undergone colonoscopy by Dr. Coretta Mendez in the past that was reportedly normal, biopsies negative for Crohn's disease. He denies fevers, chills, diarrhea, recent travel or sick contacts at home. His brother has Crohn's disease. He presented to the ED where work-up showed a leukocytosis of 12, CMP was unremarkable. CT abdomen\pelvis with IV contrast showed dilated loops of small bowel with a transition point in the right lower abdomen. There was no pneumoperitoneum, ascites or pneumatosis of the bowel. NG tube was placed in the ED.  A heme/onc consult was placed to evaluate for porphyria. His pain has improved slightly today and he is passing flatus this AM.     Past Medical History:   Diagnosis Date    Clavicle fracture     Insomnia     Pain, abdominal, nonspecific     Dr. Barak Spivey.  recurrent pain, diarrhea, nausea.    colon 6/27/12    Seasonal allergies     hx allergy shot    Vertigo          Past Surgical History:   Procedure Laterality Date    ABDOMEN SURGERY PROC UNLISTED  2006    Exploratory lap    COLONOSCOPY  6/27/12    Dr. Barak Spivey    COLONOSCOPY  4/27/06    normal, Jessica Kuster      Family History   Problem Relation Age of Onset    Crohn's Disease Brother     Cancer Neg Hx     Diabetes Neg Hx     Heart Disease Neg Hx     High Cholesterol Neg Hx     Stroke Neg Hx      Social History     Social History    Marital status: SINGLE     Spouse name:  Gokul Zamudio    Number of children: N/A    Years of education: N/A     Occupational History    [employer]      Social History Main Topics    Smoking status: Passive Smoke Exposure - Never Smoker     Types: Cigarettes    Smokeless tobacco: Never Used      Comment: Typically 2 cigarettes    Alcohol use 5.0 oz/week     10 Standard drinks or equivalent per week      Comment: occasionally    Drug use: Not on file    Sexual activity: Yes     Partners: Female     Birth control/ protection: Pill     Other Topics Concern    Not on file     Social History Narrative      Current Facility-Administered Medications   Medication Dose Route Frequency    sodium chloride (NS) flush 5-10 mL  5-10 mL IntraVENous Q8H    sodium chloride (NS) flush 5-10 mL  5-10 mL IntraVENous PRN    HYDROmorphone (DILAUDID) injection 0.5 mg  0.5 mg IntraVENous Q4H PRN    ondansetron (ZOFRAN) injection 4 mg  4 mg IntraVENous Q6H PRN    enoxaparin (LOVENOX) injection 40 mg  40 mg SubCUTAneous Q24H    dextrose 5% - 0.9% NaCl with KCl 20 mEq/L infusion  125 mL/hr IntraVENous CONTINUOUS    lidocaine (XYLOCAINE) 2 % viscous solution 10 mL  10 mL Mouth/Throat NOW    lidocaine (XYLOCAINE) 2 % jelly   Mucous Membrane PRN    diphenhydrAMINE (BENADRYL) injection 12.5 mg  12.5 mg IntraVENous Q6H PRN      No Known Allergies    Review of Systems:     []     Unable to obtain  ROS due to  []    mental status change  []    sedated   []    intubated   [x]    Total of 12 system negative, unless specified below or in HPI:  Constitutional: negative fever, negative chills, negative weight loss  Eyes:   negative visual changes  ENT:   negative sore throat, tongue or lip swelling  Respiratory:  negative cough, negative dyspnea  Cards:  negative for chest pain, palpitations, lower extremity edema  GI:   negative for nausea, vomiting, diarrhea, and abdominal pain  :  negative for frequency, dysuria  Integument:  negative for rash and pruritus  Heme:  negative for easy bruising and gum/nose bleeding  Musculoskel: negative for myalgias,  back pain and muscle weakness  Neuro:  negative for headaches, dizziness, vertigo  Psych:  negative for feelings of anxiety, depression     Objective:      Patient Vitals for the past 8 hrs:   BP Temp Pulse Resp SpO2   18 0724 137/79 97.6 °F (36.4 °C) (!) 56 15 99 %       Temp (24hrs), Av.8 °F (36.6 °C), Min:97.6 °F (36.4 °C), Max:98.2 °F (36.8 °C)      Physical Exam:  General:  Alert, cooperative, no distress, appears stated age. Eyes:  Conjunctivae/corneas clear. PERRL, EOMs intact. Nose: Nares normal, NG tube in place. Septum midline. Mucosa normal. No drainage or sinus tenderness. Mouth/Throat: Lips, mucosa, and tongue normal. Teeth and gums normal.   Neck: Supple, symmetrical, trachea midline, no adenopathy, thyroid: no enlargment/tenderness/nodules, no carotid bruit and no JVD. Back:   Symmetric, no curvature. ROM normal. No CVA tenderness. Lungs:   Clear to auscultation bilaterally. Heart:  Regular rate and rhythm, S1, S2 normal, no murmur, click, rub or gallop.    Abdomen:   Soft, mild periumbilical tenderness, non-distended, no peritonitis. Extremities: Extremities normal, atraumatic, no cyanosis or edema. Pulses: 2+ and symmetric all extremities. Skin: Skin color, texture, turgor normal. No rashes or lesions   Lymph nodes: Cervical, supraclavicular, and axillary nodes normal.     Labs: Recent Labs      05/25/18 0310   WBC  7.7   HGB  15.1   HCT  44.5   PLT  204     Recent Labs      05/25/18 0310  05/24/18 2006   NA  138  142   K  3.8  3.6   CL  105  103   CO2  26  28   GLU  135*  112*   BUN  9  10   CREA  0.68*  0.89   CA  8.6  9.6   MG  1.9   --    ALB   --   4.3   TBILI   --   0.6   SGOT   --   23   ALT   --   33     No results for input(s): INR in the last 72 hours.     No lab exists for component: INREXT

## 2018-05-26 LAB
ANION GAP SERPL CALC-SCNC: 6 MMOL/L (ref 5–15)
BUN SERPL-MCNC: 5 MG/DL (ref 6–20)
BUN/CREAT SERPL: 7 (ref 12–20)
CALCIUM SERPL-MCNC: 8.6 MG/DL (ref 8.5–10.1)
CHLORIDE SERPL-SCNC: 108 MMOL/L (ref 97–108)
CO2 SERPL-SCNC: 26 MMOL/L (ref 21–32)
CREAT SERPL-MCNC: 0.72 MG/DL (ref 0.7–1.3)
CRP SERPL-MCNC: 0.83 MG/DL
ERYTHROCYTE [DISTWIDTH] IN BLOOD BY AUTOMATED COUNT: 11.7 % (ref 11.5–14.5)
ERYTHROCYTE [SEDIMENTATION RATE] IN BLOOD: 3 MM/HR (ref 0–15)
GLUCOSE SERPL-MCNC: 93 MG/DL (ref 65–100)
HCT VFR BLD AUTO: 40.3 % (ref 36.6–50.3)
HGB BLD-MCNC: 13.5 G/DL (ref 12.1–17)
MAGNESIUM SERPL-MCNC: 1.8 MG/DL (ref 1.6–2.4)
MCH RBC QN AUTO: 31 PG (ref 26–34)
MCHC RBC AUTO-ENTMCNC: 33.5 G/DL (ref 30–36.5)
MCV RBC AUTO: 92.4 FL (ref 80–99)
NRBC # BLD: 0 K/UL (ref 0–0.01)
NRBC BLD-RTO: 0 PER 100 WBC
PHOSPHATE SERPL-MCNC: 2.8 MG/DL (ref 2.6–4.7)
PLATELET # BLD AUTO: 166 K/UL (ref 150–400)
PMV BLD AUTO: 10.1 FL (ref 8.9–12.9)
POTASSIUM SERPL-SCNC: 4 MMOL/L (ref 3.5–5.1)
RBC # BLD AUTO: 4.36 M/UL (ref 4.1–5.7)
SODIUM SERPL-SCNC: 140 MMOL/L (ref 136–145)
WBC # BLD AUTO: 5.6 K/UL (ref 4.1–11.1)

## 2018-05-26 PROCEDURE — 85652 RBC SED RATE AUTOMATED: CPT | Performed by: NURSE PRACTITIONER

## 2018-05-26 PROCEDURE — 65270000029 HC RM PRIVATE

## 2018-05-26 PROCEDURE — 85027 COMPLETE CBC AUTOMATED: CPT | Performed by: SURGERY

## 2018-05-26 PROCEDURE — 74011250636 HC RX REV CODE- 250/636: Performed by: SURGERY

## 2018-05-26 PROCEDURE — 86140 C-REACTIVE PROTEIN: CPT | Performed by: SURGERY

## 2018-05-26 PROCEDURE — 36415 COLL VENOUS BLD VENIPUNCTURE: CPT | Performed by: NURSE PRACTITIONER

## 2018-05-26 PROCEDURE — 84100 ASSAY OF PHOSPHORUS: CPT | Performed by: SURGERY

## 2018-05-26 PROCEDURE — 83735 ASSAY OF MAGNESIUM: CPT | Performed by: SURGERY

## 2018-05-26 PROCEDURE — 80048 BASIC METABOLIC PNL TOTAL CA: CPT | Performed by: SURGERY

## 2018-05-26 RX ORDER — MORPHINE SULFATE 4 MG/ML
2 INJECTION INTRAVENOUS
Status: DISCONTINUED | OUTPATIENT
Start: 2018-05-26 | End: 2018-05-29 | Stop reason: HOSPADM

## 2018-05-26 RX ORDER — DIPHENHYDRAMINE HYDROCHLORIDE 50 MG/ML
25 INJECTION, SOLUTION INTRAMUSCULAR; INTRAVENOUS
Status: DISCONTINUED | OUTPATIENT
Start: 2018-05-26 | End: 2018-05-29 | Stop reason: HOSPADM

## 2018-05-26 RX ORDER — MORPHINE SULFATE 4 MG/ML
4 INJECTION INTRAVENOUS
Status: DISCONTINUED | OUTPATIENT
Start: 2018-05-26 | End: 2018-05-29 | Stop reason: HOSPADM

## 2018-05-26 RX ADMIN — Medication 10 ML: at 15:21

## 2018-05-26 RX ADMIN — MORPHINE SULFATE 2 MG: 4 INJECTION INTRAVENOUS at 15:20

## 2018-05-26 RX ADMIN — MORPHINE SULFATE 2 MG: 4 INJECTION INTRAVENOUS at 09:07

## 2018-05-26 RX ADMIN — DIPHENHYDRAMINE HYDROCHLORIDE 25 MG: 50 INJECTION, SOLUTION INTRAMUSCULAR; INTRAVENOUS at 01:29

## 2018-05-26 RX ADMIN — DEXTROSE MONOHYDRATE, SODIUM CHLORIDE, AND POTASSIUM CHLORIDE 125 ML/HR: 50; 9; 1.49 INJECTION, SOLUTION INTRAVENOUS at 03:44

## 2018-05-26 RX ADMIN — DEXTROSE MONOHYDRATE, SODIUM CHLORIDE, AND POTASSIUM CHLORIDE 125 ML/HR: 50; 9; 1.49 INJECTION, SOLUTION INTRAVENOUS at 12:07

## 2018-05-26 RX ADMIN — MORPHINE SULFATE 2 MG: 4 INJECTION INTRAVENOUS at 20:47

## 2018-05-26 RX ADMIN — ENOXAPARIN SODIUM 40 MG: 100 INJECTION SUBCUTANEOUS at 09:07

## 2018-05-26 NOTE — PROGRESS NOTES
abd pain better  Still feels bloated  No n/v jose liquids  Passed some flatus, no bm    avss  No distress, alert  Lungs clear   cor reg  abd soft min distension bowel sounds present  nontender    Labs reviewed  Wbc normal  Lytes normal creat. Normal    ? sbo  Prior hx of abd pain prio laparoscopy 12 yr ago  Neg. Work up to day. Has had 4 episodes in 12 years. Last episode 5 years ago  Brother has crohns disease    Will cont. Clears today. encourange ambulation.  Follow clinical course  Does not need operation at this point  If improves can be dc with outpt f/u with GI and surgery  Discussed with pt and mother, 15 minutes

## 2018-05-27 PROCEDURE — 74011250636 HC RX REV CODE- 250/636: Performed by: SURGERY

## 2018-05-27 PROCEDURE — 65270000029 HC RM PRIVATE

## 2018-05-27 RX ADMIN — ENOXAPARIN SODIUM 40 MG: 100 INJECTION SUBCUTANEOUS at 08:19

## 2018-05-27 RX ADMIN — MORPHINE SULFATE 2 MG: 4 INJECTION INTRAVENOUS at 17:04

## 2018-05-27 RX ADMIN — MORPHINE SULFATE 2 MG: 4 INJECTION INTRAVENOUS at 08:20

## 2018-05-27 RX ADMIN — MORPHINE SULFATE 2 MG: 4 INJECTION INTRAVENOUS at 21:54

## 2018-05-27 RX ADMIN — DEXTROSE MONOHYDRATE, SODIUM CHLORIDE, AND POTASSIUM CHLORIDE 125 ML/HR: 50; 9; 1.49 INJECTION, SOLUTION INTRAVENOUS at 00:57

## 2018-05-27 NOTE — PROGRESS NOTES
Attempted to collect blood from patient for lab work. After 3 attempts, patient refused to let the nurses try any more.

## 2018-05-27 NOTE — PROGRESS NOTES
Jesus Medina M.D.  [phone]           GI PROGRESS NOTE        NAME: Viry Dillard   :  1985   MRN:  [id number]       Subjective:   Reports he continues to see improvement with pain, however abdomen continues to feel tight. Tolerating full liquids      Objective: In NAD      VITALS:   Last 24hrs VS reviewed since prior progress note. Most recent are:  Visit Vitals    /84 (BP 1 Location: Right arm, BP Patient Position: At rest)    Pulse (!) 45    Temp 98.6 °F (37 °C)    Resp 16    Ht 6' 4\" (1.93 m)    Wt 83 kg (183 lb)    SpO2 98%    BMI 22.28 kg/m2       Intake/Output Summary (Last 24 hours) at 18 1417  Last data filed at 18 4918   Gross per 24 hour   Intake          2979.17 ml   Output             1100 ml   Net          1879.17 ml       PHYSICAL EXAM:  General: Alert, in no acute distress    HEENT: Anicteric sclerae. Lungs:            CTA Bilaterally. Heart:  Regular  rhythm,    Abdomen: Soft, less distended or tender, slightly firm. hypoactive Bowel sounds, no HSM  Extremities: No c/c/e  Neurologic:  CN 2-12 gi, Alert and oriented X 3. No acute neurological distress   Psych:   Good insight. Not anxious nor agitated.     Lab Data Reviewed:   Recent Labs      18   0541  18   0310   WBC  5.6  7.7   HGB  13.5  15.1   HCT  40.3  44.5   PLT  166  204     Recent Labs      18   0541  18   0310   NA  140  138   K  4.0  3.8   CL  108  105   CO2  26  26   BUN  5*  9   CREA  0.72  0.68*   GLU  93  135*   PHOS  2.8   --    CA  8.6  8.6     Recent Labs      18   SGOT  23   AP  54   TP  8.4*   ALB  4.3   GLOB  4.1*   LPSE  156       ________________________________________________________________________  Patient Active Problem List   Diagnosis Code    Pain, abdominal, nonspecific R10.9    Insomnia G47.00    Small bowel obstruction ([facility] [address] K56.609         Assessment and Plan:  Small bowel obstruction concerning for underlying Crohn's disease, improving with current management. May proceed with small bowel series on Tuesday based on symptom progression.  Will see in am.       Signed By: Randee Crump MD     5/27/2018  3:41 PM

## 2018-05-27 NOTE — PROGRESS NOTES
Pt feeling a little better. Passing flatus, had diarrhea yesterday but none today.   AF, VSS  Abdomen soft, slightly distended, nttp  Plan: continue current management  Full liquid diet

## 2018-05-28 PROCEDURE — 65270000029 HC RM PRIVATE

## 2018-05-28 PROCEDURE — 74011250636 HC RX REV CODE- 250/636: Performed by: SURGERY

## 2018-05-28 PROCEDURE — 74011250637 HC RX REV CODE- 250/637: Performed by: SURGERY

## 2018-05-28 RX ORDER — ACETAMINOPHEN 325 MG/1
650 TABLET ORAL
Status: DISCONTINUED | OUTPATIENT
Start: 2018-05-28 | End: 2018-05-29 | Stop reason: HOSPADM

## 2018-05-28 RX ADMIN — Medication 10 ML: at 16:10

## 2018-05-28 RX ADMIN — DEXTROSE MONOHYDRATE, SODIUM CHLORIDE, AND POTASSIUM CHLORIDE 50 ML/HR: 50; 9; 1.49 INJECTION, SOLUTION INTRAVENOUS at 19:16

## 2018-05-28 RX ADMIN — ACETAMINOPHEN 650 MG: 325 TABLET ORAL at 13:23

## 2018-05-28 RX ADMIN — ENOXAPARIN SODIUM 40 MG: 100 INJECTION SUBCUTANEOUS at 09:03

## 2018-05-28 NOTE — PROGRESS NOTES
Dora Mcdaniel M.D.  [phone]           GI PROGRESS NOTE        NAME: Ana Hermosillo   :  1985   MRN:  [id number]       Subjective:   Reports less tightness today, no bowel movements, feels some tightness come back after taking liquids. Objective: In NAD      VITALS:   Last 24hrs VS reviewed since prior progress note. Most recent are:  Visit Vitals    /67 (BP 1 Location: Right arm, BP Patient Position: Other (comment))    Pulse (!) 53    Temp 98.2 °F (36.8 °C)    Resp 16    Ht 6' 4\" (1.93 m)    Wt 83 kg (183 lb)    SpO2 98%    BMI 22.28 kg/m2       Intake/Output Summary (Last 24 hours) at 18 1406  Last data filed at 18 1044   Gross per 24 hour   Intake          2137.92 ml   Output             1150 ml   Net           987.92 ml       PHYSICAL EXAM:  General: Alert, in no acute distress    HEENT: Anicteric sclerae. Lungs:            CTA Bilaterally. Heart:  Regular  rhythm,    Abdomen: Soft, less distended tender or firm,  more active Bowel sounds, no HSM  Extremities: No c/c/e  Neurologic:  CN 2-12 gi, Alert and oriented X 3. No acute neurological distress   Psych:   Good insight. Not anxious nor agitated. Lab Data Reviewed:   Recent Labs      18   0541   WBC  5.6   HGB  13.5   HCT  40.3   PLT  166     Recent Labs      18   0541   NA  140   K  4.0   CL  108   CO2  26   BUN  5*   CREA  0.72   GLU  93   PHOS  2.8   CA  8.6     No results for input(s): SGOT, GPT, AP, TBIL, TP, ALB, GLOB, GGT, AML, LPSE in the last 72 hours. No lab exists for component: AMYP, HLPSE    ________________________________________________________________________  Patient Active Problem List   Diagnosis Code    Pain, abdominal, nonspecific R10.9    Insomnia G47.00    Small bowel obstruction ([address] K56.396         Assessment and Plan:  Small bowel obstruction concerning for underlying Crohn's disease, improving with current management.   Will get small bowel series tomorrow. NPO after midnight.      Signed By: Duy Malik MD     5/28/2018  2:08 PM

## 2018-05-28 NOTE — PROGRESS NOTES
Pt seen and examined. States he feels better, less tightness in the abdomen. No BM yet, still passing flatus and tolerated fulls. AF, VSS  Abdomen soft, nt, nd.  bs present  Plan: advance diet to GI lite. If has BM later today and feels well may be discharged home.

## 2018-05-28 NOTE — PROGRESS NOTES
Problem: Falls - Risk of  Goal: *Absence of Falls  Document Paul Fall Risk and appropriate interventions in the flowsheet.    Outcome: Progressing Towards Goal  Fall Risk Interventions:            Medication Interventions: Patient to call before getting OOB, Teach patient to arise slowly

## 2018-05-29 ENCOUNTER — APPOINTMENT (OUTPATIENT)
Dept: GENERAL RADIOLOGY | Age: 33
DRG: 390 | End: 2018-05-29
Attending: INTERNAL MEDICINE
Payer: COMMERCIAL

## 2018-05-29 VITALS
OXYGEN SATURATION: 99 % | BODY MASS INDEX: 22.29 KG/M2 | WEIGHT: 183 LBS | TEMPERATURE: 98.3 F | DIASTOLIC BLOOD PRESSURE: 76 MMHG | HEIGHT: 76 IN | HEART RATE: 51 BPM | RESPIRATION RATE: 14 BRPM | SYSTOLIC BLOOD PRESSURE: 122 MMHG

## 2018-05-29 PROCEDURE — 74250 X-RAY XM SM INT 1CNTRST STD: CPT

## 2018-05-29 PROCEDURE — 74011250636 HC RX REV CODE- 250/636: Performed by: SURGERY

## 2018-05-29 RX ADMIN — ENOXAPARIN SODIUM 40 MG: 100 INJECTION SUBCUTANEOUS at 12:11

## 2018-05-29 NOTE — PROGRESS NOTES
No new scripts. Patient received a copy of instructions.  No follow up appointments Verbalized understanding

## 2018-05-29 NOTE — PROGRESS NOTES
[address] NP  [phone]           GI PROGRESS NOTE        NAME: Baldev Winkler   :  1985   MRN:  [id number]       Subjective:   Reports his abdominal pain has improved. Denies nausea and vomiting. Had a small bowel movement over night. Objective:         VITALS:   Last 24hrs VS reviewed since prior progress note. Most recent are:  Visit Vitals    /72 (BP 1 Location: Right arm, BP Patient Position: At rest)    Pulse (!) 56    Temp 98.2 °F (36.8 °C)    Resp 14    Ht 6' 4\" (1.93 m)    Wt 83 kg (183 lb)    SpO2 97%    BMI 22.28 kg/m2       Intake/Output Summary (Last 24 hours) at 18 0910  Last data filed at 18 0433   Gross per 24 hour   Intake          1798.33 ml   Output                0 ml   Net          1798.33 ml       PHYSICAL EXAM:  General: Alert, in no acute distress    HEENT: Anicteric sclerae. Lungs:            CTA Bilaterally. Heart:  Regular  rhythm,    Abdomen: Soft, Non distended, Non tender.  (+)Bowel sounds, no HSM  Extremities: No c/c/e  Neurologic:  CN 2-12 gi, Alert and oriented X 3. No acute neurological distress   Psych:   Good insight. Not anxious nor agitated. Lab Data Reviewed:   No results for input(s): WBC, HGB, HCT, PLT, HGBEXT, HCTEXT, PLTEXT in the last 72 hours. No results for input(s): NA, K, CL, CO2, BUN, CREA, GLU, PHOS, CA in the last 72 hours. No results for input(s): SGOT, GPT, AP, TBIL, TP, ALB, GLOB, GGT, AML, LPSE in the last 72 hours. No lab exists for component: Tereza Freida    ________________________________________________________________________  Patient Active Problem List   Diagnosis Code    Pain, abdominal, nonspecific R10.9    Insomnia G47.00    Small bowel obstruction ([address] K56.985         Assessment and Plan:  Small Bowel Obstruction:  Improving. He has been passing gas and had a small bowel movement recently. Pain has improved. - NPO today for small bowel series. Diet as tolerated after procedure. - WIll follow the results of small bowel series. - WIll plan to pursue EGD and Colonoscopy as outpt. May be able to discharge home today after imaging. Will discuss with Dr. Stefanie Squires.        Signed By: Julius Mao NP     5/29/2018  9:10 AM

## 2018-05-29 NOTE — DISCHARGE INSTRUCTIONS
Patient Discharge Instructions    Viry Dillard / [id number] : 1985    Admitted 2018 Discharged: 2018     Take Home Medications       · It is important that you take the medication exactly as they are prescribed. · Keep your medication in the bottles provided by the pharmacist and keep a list of the medication names, dosages, and times to be taken in your wallet. · Do not take other medications without consulting your doctor. What to do at Home    Recommended diet: Resume previous diet    Recommended activity: As tolerated    Follow up with Dr. Maggie Campos as needed. Follow up with Dr. Liliana Martins. Call Dr. Maggie Campos or go to the ER if you develop worsening pain, fever, vomiting, or any other symptoms that concern you.

## 2018-05-29 NOTE — PROGRESS NOTES
General Surgery Daily Progress Note    Patient: Usha Leyva MRN: [id number]  SSN: [id number]    YOB: 1985  Age: 27 y.o. Sex: male      Admit Date: 5/24/2018    Subjective:   States he has some residual \"tightness\" at left abdomen but no pain. Denies N/V. Tolerating liquids over the weekend. SBFT completed this am.     Current Facility-Administered Medications   Medication Dose Route Frequency    acetaminophen (TYLENOL) tablet 650 mg  650 mg Oral Q4H PRN    diphenhydrAMINE (BENADRYL) injection 25 mg  25 mg IntraVENous Q6H PRN    morphine injection 2 mg  2 mg IntraVENous Q3H PRN    morphine injection 4 mg  4 mg IntraVENous Q3H PRN    sodium chloride (NS) flush 5-10 mL  5-10 mL IntraVENous Q8H    sodium chloride (NS) flush 5-10 mL  5-10 mL IntraVENous PRN    ondansetron (ZOFRAN) injection 4 mg  4 mg IntraVENous Q6H PRN    enoxaparin (LOVENOX) injection 40 mg  40 mg SubCUTAneous Q24H    lidocaine (XYLOCAINE) 2 % jelly   Mucous Membrane PRN    diphenhydrAMINE (BENADRYL) injection 12.5 mg  12.5 mg IntraVENous Q6H PRN        Objective:      05/27 1901 - 05/29 0700  In: 3837.1 [P.O.:720; I.V.:3117.1]  Out: 800 [Urine:800]  Patient Vitals for the past 8 hrs:   BP Temp Pulse Resp SpO2   05/29/18 1207 122/76 98.3 °F (36.8 °C) (!) 51 14 99 %   05/29/18 0815 117/72 98.2 °F (36.8 °C) (!) 56 14 97 %       Physical Exam:  General: Alert, cooperative, NAD  Lungs: Unlabored  Heart:  Regular rate and  rhythm  Abdomen: Soft, non-tender, non-distended  Extremities: Warm, moves all, no edema  Skin:  Warm and dry, no rash    Labs: No results for input(s): WBC, HGB, HCT, PLT, HGBEXT, HCTEXT, PLTEXT in the last 72 hours. No results for input(s): NA, K, CL, CO2, GLU, BUN, CREA, CA, MG, PHOS, ALB, TBIL, TBILI, SGOT, ALT in the last 72 hours. Assessment / Plan:   · SBO  · SBFT unremarkable  · Advance diet  · Will plan for discharge today if tolerating diet. F/u with GI as outpatient for continued evaluation.

## 2018-05-30 NOTE — DISCHARGE SUMMARY
Surgery Discharge Summary     Patient ID:  Charissa Keen  [id number]  45 y.o.  1985    Admit date: 5/24/2018    Discharge date and time: 5/29/2018  1:31 PM     Admission Diagnoses:    Patient Active Problem List   Diagnosis Code    Pain, abdominal, nonspecific R10.9    Insomnia G47.00    Small bowel obstruction ([facility] [address] K56.609       Discharge Diagnoses: There are no discharge diagnoses documented for the most recent discharge. Patient Active Problem List   Diagnosis Code    Pain, abdominal, nonspecific R10.9    Insomnia G47.00    Small bowel obstruction ([facility] [address] K56.609       Procedures for this admission: [facility] Course: Mr. Uziel Massey presented to the ED on DOA with c/o abdominal pain and was found to have evidence of SBO. He was admitted and managed expectantly with NG decompression, bowel rest, and hydration. Bowel function returned and pain improved. Patient reported years-long hx of intermittent similar episodes. GI was consulted and he will follow up with them as outpatient for further endoscopic investigation. SBFT prior to discharge showed prompt progression of contrast to colon. Patient was discharged home in stable condition. Disposition: home    Discharged Condition : stable    Instructions: Follow-up in office  in 1-2 weeks.               Signed:  CECIL Martins  5/30/2018  2:48 PM

## 2018-08-10 ENCOUNTER — OFFICE VISIT (OUTPATIENT)
Dept: INTERNAL MEDICINE CLINIC | Age: 33
End: 2018-08-10

## 2018-08-10 VITALS
OXYGEN SATURATION: 99 % | DIASTOLIC BLOOD PRESSURE: 76 MMHG | RESPIRATION RATE: 16 BRPM | SYSTOLIC BLOOD PRESSURE: 113 MMHG | WEIGHT: 179 LBS | BODY MASS INDEX: 21.8 KG/M2 | HEIGHT: 76 IN | TEMPERATURE: 98.4 F | HEART RATE: 68 BPM

## 2018-08-10 DIAGNOSIS — F43.20 ADJUSTMENT DISORDER, UNSPECIFIED TYPE: ICD-10-CM

## 2018-08-10 DIAGNOSIS — F41.9 ANXIETY: Primary | ICD-10-CM

## 2018-08-10 RX ORDER — ESCITALOPRAM OXALATE 5 MG/1
5 TABLET ORAL DAILY
Qty: 30 TAB | Refills: 1 | Status: SHIPPED | OUTPATIENT
Start: 2018-08-10 | End: 2018-09-10 | Stop reason: ALTCHOICE

## 2018-08-10 NOTE — MR AVS SNAPSHOT
[facility] 
 
 
 [address] [facility] [address] [facility] 
[phone] Patient: Paris Allen MRN: [id number] QPW:8/64/0372 Visit Information Date & Time Provider Department Dept. Phone Encounter #  
 8/10/2018  1:15 PM Manfred Bradley MD [facility] [address] [facility] [id number] Upcoming Health Maintenance Date Due DTaP/Tdap/Td series (1 - Tdap) 9/27/2004 Influenza Age 5 to Adult 8/1/2018 Allergies as of 8/10/2018  Review Complete On: 8/75/4006 By: Lou Wren Wears No Known Allergies Current Immunizations  Never Reviewed No immunizations on file. Not reviewed this visit You Were Diagnosed With   
  
 Codes Comments Anxiety    -  Primary ICD-10-CM: F41.9 ICD-9-CM: 300.00 Adjustment disorder, unspecified type     ICD-10-CM: F43.20 ICD-9-CM: 309.9 Vitals BP Pulse Temp Resp Height(growth percentile) Weight(growth percentile) 113/76 (BP 1 Location: Left arm, BP Patient Position: Sitting) 68 98.4 °F (36.9 °C) (Oral) 16 6' 4\" (1.93 m) 179 lb (81.2 kg) SpO2 BMI Smoking Status 99% 21.79 kg/m2 Passive Smoke Exposure - Never Smoker Vitals History BMI and BSA Data Body Mass Index Body Surface Area 21.79 kg/m 2 2.09 m 2 Preferred Pharmacy Pharmacy Name Phone [facility] #[id number]- [address] [facility], [address] [place] [address]  [phone] Your Updated Medication List  
  
   
This list is accurate as of 8/10/18  1:51 PM.  Always use your most recent med list.  
  
  
  
  
 escitalopram oxalate 5 mg tablet Commonly known as:  Jung Skillernikia Take 1 Tab by mouth daily. Prescriptions Sent to Pharmacy Refills  
 escitalopram oxalate (LEXAPRO) 5 mg tablet 1 Sig: Take 1 Tab by mouth daily. Class: Normal  
 Pharmacy: [facility]/pharmacy #[id number]- [address] [facility], [address] [place] [address]  Ph #: [phone]  Route: Oral  
  
 Introducing [address]! Dear Thais Jernigan: 
Thank you for requesting a [employer] account. Our records indicate that you already have an active [employer] account. You can access your account anytime at [email or web address] Did you know that you can access your hospital and ER discharge instructions at any time in [employer]? You can also review all of your test results from your hospital stay or ER visit. Additional Information If you have questions, please visit the Frequently Asked Questions section of the [employer] website at [email or web address] Remember, [employer] is NOT to be used for urgent needs. For medical emergencies, dial 911. Now available from your iPhone and Android! Please provide this summary of care documentation to your next provider. Your primary care clinician is listed as Roselia Torres. If you have any questions after today's visit, please call [phone].

## 2018-08-10 NOTE — PROGRESS NOTES
Law Tillman is a 27 y.o. male who presents today for Anxiety  . He has a history of   Patient Active Problem List   Diagnosis Code    Pain, abdominal, nonspecific R10.9    Insomnia G47.00    [facility] K56.609   . Today patient is here for an acute visit. Anxiety - Has been getting worse for some time. Started seeing a therapist about 2 mos ago. Rudi Kawasaki). Often shutting down, feeling overwhelmed. No EtOH, drug use. Marital issues have been a bit worse. Two young children at home. Wife knows about his stress level. No significant family issues of anxiety or depression. Patient denies: sweating, chest pain, dizziness, paresthesias, racing thoughts, feelings of losing control, difficulty concentrating, suicidal ideation. Denies substance or alcohol abuse. ROS  Review of Systems   Constitutional: Negative for chills, fever and weight loss. Respiratory: Negative for cough and shortness of breath. Cardiovascular: Negative for chest pain, palpitations and leg swelling. Gastrointestinal: Negative for abdominal pain, diarrhea, heartburn, nausea and vomiting. Genitourinary: Negative for dysuria, frequency and urgency. Musculoskeletal: Negative for myalgias. Neurological: Negative. Endo/Heme/Allergies: Does not bruise/bleed easily. Psychiatric/Behavioral: Negative for depression, hallucinations, memory loss, substance abuse and suicidal ideas. The patient is nervous/anxious. The patient does not have insomnia. Visit Vitals    /76 (BP 1 Location: Left arm, BP Patient Position: Sitting)    Pulse 68    Temp 98.4 °F (36.9 °C) (Oral)    Resp 16    Ht 6' 4\" (1.93 m)    Wt 179 lb (81.2 kg)    SpO2 99%    BMI 21.79 kg/m2       Physical Exam   Constitutional: He is oriented to person, place, and time. He appears well-developed and well-nourished. HENT:   Head: Normocephalic and atraumatic.    Eyes: EOM are normal. Pupils are equal, round, and reactive to light. Neck: Normal range of motion. Neck supple. Cardiovascular: Normal rate and regular rhythm. No murmur heard. Pulmonary/Chest: Effort normal and breath sounds normal. No respiratory distress. Abdominal: Soft. Bowel sounds are normal. He exhibits no distension. Musculoskeletal: Normal range of motion. He exhibits no edema. Neurological: He is alert and oriented to person, place, and time. Skin: Skin is warm and dry. He is not diaphoretic. Psychiatric: He has a normal mood and affect. His behavior is normal.         Current Outpatient Prescriptions   Medication Sig    escitalopram oxalate (LEXAPRO) 5 mg tablet Take 1 Tab by mouth daily. No current facility-administered medications for this visit. Past Medical History:   Diagnosis Date    Anxiety     Clavicle fracture     Insomnia     Pain, abdominal, nonspecific     Dr. Adamson Has.  recurrent pain, diarrhea, nausea. colon 6/27/12    Seasonal allergies     hx allergy shot    Vertigo       Past Surgical History:   Procedure Laterality Date    ABDOMEN SURGERY PROC UNLISTED  2006    Exploratory lap    COLONOSCOPY  6/27/12    Dr. Adamson Has    COLONOSCOPY  4/27/06    normal, Jp Garcia      Social History   Substance Use Topics    Smoking status: Passive Smoke Exposure - Never Smoker     Types: Cigarettes    Smokeless tobacco: Never Used      Comment: Typically 2 cigarettes    Alcohol use 5.0 oz/week     10 Standard drinks or equivalent per week      Comment: occasionally      Family History   Problem Relation Age of Onset    Crohn's Disease Brother     Cancer Neg Hx     Diabetes Neg Hx     Heart Disease Neg Hx     High Cholesterol Neg Hx     Stroke Neg Hx         No Known Allergies     Assessment/Plan  Diagnoses and all orders for this visit:    1. Anxiety - discussed options. No red flags. Sees a therapist. Start low dose SSRI. Discussed MOA and s/e. Revisit in 4-6 weeks.    Meena Guzman was counseled on age-appropriate/ guideline-based risk prevention behaviors and screening for a 27y.o. year old   male . We also discussed adjustments in screening based on family history if necessary. Printed instructions for preventative screening guidelines and healthy behaviors given to patient with after visit summary.    -     escitalopram oxalate (LEXAPRO) 5 mg tablet; Take 1 Tab by mouth daily. 2. Adjustment disorder, unspecified type        Follow-up Disposition:  Return in about 6 weeks (around 9/21/2018).     Jonathan Head MD  8/10/2018

## 2018-09-10 ENCOUNTER — OFFICE VISIT (OUTPATIENT)
Dept: INTERNAL MEDICINE CLINIC | Age: 33
End: 2018-09-10

## 2018-09-10 VITALS
OXYGEN SATURATION: 98 % | HEIGHT: 76 IN | HEART RATE: 78 BPM | SYSTOLIC BLOOD PRESSURE: 117 MMHG | TEMPERATURE: 98.2 F | BODY MASS INDEX: 21.92 KG/M2 | DIASTOLIC BLOOD PRESSURE: 74 MMHG | WEIGHT: 180 LBS | RESPIRATION RATE: 16 BRPM

## 2018-09-10 DIAGNOSIS — F41.9 ANXIETY: Primary | ICD-10-CM

## 2018-09-10 RX ORDER — ESCITALOPRAM OXALATE 20 MG/1
20 TABLET ORAL DAILY
Qty: 30 TAB | Refills: 5 | Status: SHIPPED | OUTPATIENT
Start: 2018-09-10 | End: 2019-03-24 | Stop reason: SDUPTHER

## 2018-09-10 NOTE — MR AVS SNAPSHOT
[facility] 
 
 
 [address] 
[phone] Patient: Jeremiah Francis MRN: [id number] KHL:[id number] Visit Information Date & Time Provider Department Dept. Phone Encounter #  
 9/10/2018 10:30 AM Erika Smith MD [facility] [address] [id number] Upcoming Health Maintenance Date Due DTaP/Tdap/Td series (1 - Tdap) 9/27/2004 Influenza Age 5 to Adult 8/1/2018 Allergies as of 9/10/2018  Review Complete On: 5/89/3262 By: Odella  Wears No Known Allergies Current Immunizations  Never Reviewed No immunizations on file. Not reviewed this visit You Were Diagnosed With   
  
 Codes Comments Anxiety    -  Primary ICD-10-CM: F41.9 ICD-9-CM: 300.00 Vitals BP Pulse Temp Resp Height(growth percentile) Weight(growth percentile) 117/74 (BP 1 Location: Left arm, BP Patient Position: Sitting) 78 98.2 °F (36.8 °C) (Oral) 16 6' 4\" (1.93 m) 180 lb (81.6 kg) SpO2 BMI Smoking Status 98% 21.91 kg/m2 Passive Smoke Exposure - Never Smoker Vitals History BMI and BSA Data Body Mass Index Body Surface Area  
 21.91 kg/m 2 2.09 m 2 Preferred Pharmacy Pharmacy Name Phone [facility]/PHARMACY #[id number]- [address] [facility], [address] [place] [address]  [phone] Your Updated Medication List  
  
   
This list is accurate as of 9/10/18 10:53 AM.  Always use your most recent med list.  
  
  
  
  
 escitalopram oxalate 20 mg tablet Commonly known as:  Hankamer Heir Take 1 Tab by mouth daily. Prescriptions Sent to Pharmacy Refills  
 escitalopram oxalate (LEXAPRO) 20 mg tablet 5 Sig: Take 1 Tab by mouth daily. Class: Normal  
 Pharm[facility]: [id number] [address] [facility] [address] [place] [address]  Ph #: [phone] Route: Oral  
  
Introducing [facility]! Dear Aidan Ivey: Thank you for requesting a [employer] account. Our records indicate that you already have an active [employer] account. You can access your account anytime at [email or web address] Did you know that you can access your hospital and ER discharge instructions at any time in [employer]? You can also review all of your test results from your hospital stay or ER visit. Additional Information If you have questions, please visit the Frequently Asked Questions section of the [employer] website at [email or web address] Remember, [employer] is NOT to be used for urgent needs. For medical emergencies, dial 911. Now available from your iPhone and Android! Please provide this summary of care documentation to your next provider. Your primary care clinician is listed as Juan Carlos Lancaster. If you have any questions after today's visit, please call [phone].

## 2019-03-24 DIAGNOSIS — F41.9 ANXIETY: ICD-10-CM

## 2019-03-24 RX ORDER — ESCITALOPRAM OXALATE 20 MG/1
TABLET ORAL
Qty: 30 TAB | Refills: 5 | Status: SHIPPED | OUTPATIENT
Start: 2019-03-24 | End: 2019-05-30 | Stop reason: SDUPTHER

## 2019-08-26 ENCOUNTER — TELEPHONE (OUTPATIENT)
Dept: INTERNAL MEDICINE CLINIC | Age: 34
End: 2019-08-26

## 2019-08-26 ENCOUNTER — OFFICE VISIT (OUTPATIENT)
Dept: INTERNAL MEDICINE CLINIC | Age: 34
End: 2019-08-26

## 2019-08-26 VITALS
WEIGHT: 181 LBS | BODY MASS INDEX: 22.04 KG/M2 | DIASTOLIC BLOOD PRESSURE: 82 MMHG | HEIGHT: 76 IN | HEART RATE: 63 BPM | RESPIRATION RATE: 16 BRPM | SYSTOLIC BLOOD PRESSURE: 120 MMHG | TEMPERATURE: 98.4 F | OXYGEN SATURATION: 98 %

## 2019-08-26 DIAGNOSIS — F41.9 ANXIETY: ICD-10-CM

## 2019-08-26 DIAGNOSIS — J32.9 SINUSITIS, UNSPECIFIED CHRONICITY, UNSPECIFIED LOCATION: Primary | ICD-10-CM

## 2019-08-26 RX ORDER — ESCITALOPRAM OXALATE 20 MG/1
20 TABLET ORAL DAILY
Qty: 90 TAB | Refills: 1 | Status: SHIPPED | OUTPATIENT
Start: 2019-08-26 | End: 2020-02-20

## 2019-08-26 RX ORDER — AMOXICILLIN AND CLAVULANATE POTASSIUM 875; 125 MG/1; MG/1
1 TABLET, FILM COATED ORAL EVERY 12 HOURS
Qty: 20 TAB | Refills: 0 | Status: SHIPPED | OUTPATIENT
Start: 2019-08-26 | End: 2019-09-05

## 2019-08-26 RX ORDER — FLUTICASONE PROPIONATE 50 MCG
2 SPRAY, SUSPENSION (ML) NASAL DAILY
Qty: 1 BOTTLE | Refills: 2 | Status: SHIPPED | OUTPATIENT
Start: 2019-08-26 | End: 2019-09-17 | Stop reason: SDUPTHER

## 2019-08-26 NOTE — PROGRESS NOTES
Nighat Castillo is a 26 y.o. male who presents today for Nasal Congestion; Sinus Pain; and Sore Throat  . He has a history of   Patient Active Problem List   Diagnosis Code    Pain, abdominal, nonspecific R10.9    Insomnia G47.00    [facility] K56.609   . Today patient is here for an acute visit. Sivan Miller Upper respiratory illness:  Nighat Castillo presents with complaints of congestion, post nasal drip, productive cough, headache and bilateral sinus pain for 5 days. no nausea and no vomiting . he has not had  fever and chills. Symptoms are moderate. Over-the-counter remedies including Mucinex D with some help. Drinking plenty of fluids: no  Asthma?:  no  non-smoker  Contacts with similar infections: yes, 2.5 y.o son with URI. Anxiety - on Lexapro and stable. Patient is seen for anxiety disorder. Current treatment includes SSRI and no other therapies. Ongoing symptoms include: none. Patient denies: sweating, chest pain, dizziness, paresthesias, racing thoughts, feelings of losing control, difficulty concentrating, suicidal ideation. Denies substance or alcohol abuse. Reported side effects from the treatment: none. ROS  Review of Systems   Constitutional: Positive for malaise/fatigue. Negative for chills, fever and weight loss. HENT: Positive for congestion, sinus pain and sore throat. Eyes: Negative for blurred vision, double vision and photophobia. Respiratory: Positive for cough. Negative for shortness of breath. Cardiovascular: Negative for chest pain, palpitations and leg swelling. Gastrointestinal: Negative for abdominal pain, constipation, diarrhea, heartburn, nausea and vomiting. Genitourinary: Negative for dysuria, frequency and urgency. Musculoskeletal: Negative for joint pain and myalgias. Skin: Negative for rash. Neurological: Negative. Negative for headaches. Endo/Heme/Allergies: Does not bruise/bleed easily. Psychiatric/Behavioral: Negative for memory loss and suicidal ideas. Visit Vitals  /82 (BP 1 Location: Left arm, BP Patient Position: Sitting)   Pulse 63   Temp 98.4 °F (36.9 °C) (Oral)   Resp 16   Ht 6' 4\" (1.93 m)   Wt 181 lb (82.1 kg)   SpO2 98%   BMI 22.03 kg/m²       Physical Exam   Constitutional: He is oriented to person, place, and time. He appears well-developed and well-nourished. HENT:   Head: Normocephalic and atraumatic. Fluid behind both tympanic membranes. Eyes: Pupils are equal, round, and reactive to light. EOM are normal.   Cardiovascular: Normal rate and regular rhythm. No murmur heard. Pulmonary/Chest: Effort normal and breath sounds normal. No stridor. No respiratory distress. Lungs clear no wheezing no egophony no basilar crackles   Abdominal: Soft. Bowel sounds are normal. He exhibits no distension and no mass. There is no tenderness. There is no guarding. Musculoskeletal: Normal range of motion. He exhibits no edema. Neurological: He is alert and oriented to person, place, and time. Skin: Skin is warm and dry. He is not diaphoretic. Psychiatric: He has a normal mood and affect. His behavior is normal.         Current Outpatient Medications   Medication Sig    escitalopram oxalate (LEXAPRO) 20 mg tablet TAKE 1 TABLET BY MOUTH EVERY DAY     No current facility-administered medications for this visit. Past Medical History:   Diagnosis Date    Anxiety     Clavicle fracture     Insomnia     Pain, abdominal, nonspecific     Dr. Phoenix Bernard.  recurrent pain, diarrhea, nausea.    colon 6/27/12    Seasonal allergies     hx allergy shot    Vertigo       Past Surgical History:   Procedure Laterality Date    ABDOMEN SURGERY PROC UNLISTED  2006    Exploratory lap    COLONOSCOPY  6/27/12    Dr. Phoenix Bernard    COLONOSCOPY  4/27/06    normal, Ruby Lente      Social History     Tobacco Use    Smoking status: Passive Smoke Exposure - Never Smoker    Smokeless tobacco: Never Used    Tobacco comment: Typically 2 cigarettes   Substance Use Topics    Alcohol use: Yes     Alcohol/week: 8.3 standard drinks     Types: 10 Standard drinks or equivalent per week     Comment: occasionally      Family History   Problem Relation Age of Onset    Crohn's Disease Brother     Cancer Neg Hx     Diabetes Neg Hx     Heart Disease Neg Hx     High Cholesterol Neg Hx     Stroke Neg Hx         No Known Allergies     Assessment/Plan  Diagnoses and all orders for this visit:    1. Sinusitis, unspecified chronicity, unspecified location -discussed that this is a bit early for bacterial sinus infection. Patient to use nasal steroids and continue Mucinex. If not better by midweek would start course of antibiotics. -     fluticasone propionate (FLONASE) 50 mcg/actuation nasal spray; 2 Sprays by Both Nostrils route daily. -     amoxicillin-clavulanate (AUGMENTIN) 875-125 mg per tablet; Take 1 Tab by mouth every twelve (12) hours for 10 days. 2. Anxiety -able on current SSRI dose. Continue current therapy  -     escitalopram oxalate (LEXAPRO) 20 mg tablet; Take 1 Tab by mouth daily. Edison Penn MD  8/26/2019    This note was created with the help of speech recognition software Petrona Hein) and may contain some 'sound alike' errors.

## 2019-08-26 NOTE — PATIENT INSTRUCTIONS
Sinusitis: Care Instructions  Your Care Instructions    Sinusitis is an infection of the lining of the sinus cavities in your head. Sinusitis often follows a cold. It causes pain and pressure in your head and face. In most cases, sinusitis gets better on its own in 1 to 2 weeks. But some mild symptoms may last for several weeks. Sometimes antibiotics are needed. Follow-up care is a key part of your treatment and safety. Be sure to make and go to all appointments, and call your doctor if you are having problems. It's also a good idea to know your test results and keep a list of the medicines you take. How can you care for yourself at home? · Take an over-the-counter pain medicine, such as acetaminophen (Tylenol), ibuprofen (Advil, Motrin), or naproxen (Aleve). Read and follow all instructions on the label. · If the doctor prescribed antibiotics, take them as directed. Do not stop taking them just because you feel better. You need to take the full course of antibiotics. · Be careful when taking over-the-counter cold or flu medicines and Tylenol at the same time. Many of these medicines have acetaminophen, which is Tylenol. Read the labels to make sure that you are not taking more than the recommended dose. Too much acetaminophen (Tylenol) can be harmful. · Breathe warm, moist air from a steamy shower, a hot bath, or a sink filled with hot water. Avoid cold, dry air. Using a humidifier in your home may help. Follow the directions for cleaning the machine. · Use saline (saltwater) nasal washes to help keep your nasal passages open and wash out mucus and bacteria. You can buy saline nose drops at a grocery store or drugstore. Or you can make your own at home by adding 1 teaspoon of salt and 1 teaspoon of baking soda to 2 cups of distilled water. If you make your own, fill a bulb syringe with the solution, insert the tip into your nostril, and squeeze gently. Lyndemill Sheller your nose.   · Put a hot, wet towel or a warm gel pack on your face 3 or 4 times a day for 5 to 10 minutes each time. · Try a decongestant nasal spray like oxymetazoline (Afrin). Do not use it for more than 3 days in a row. Using it for more than 3 days can make your congestion worse. When should you call for help? Call your doctor now or seek immediate medical care if:    · You have new or worse swelling or redness in your face or around your eyes.     · You have a new or higher fever.    Watch closely for changes in your health, and be sure to contact your doctor if:    · You have new or worse facial pain.     · The mucus from your nose becomes thicker (like pus) or has new blood in it.     · You are not getting better as expected. Where can you learn more? Go to http://nicole-prasanth.info/. Enter B148 in the search box to learn more about \"Sinusitis: Care Instructions. \"  Current as of: October 21, 2018  Content Version: 12.1  © 2270-4824 [employer]. Care instructions adapted under license by [employer] (which disclaims liability or warranty for this information). If you have questions about a medical condition or this instruction, always ask your healthcare professional. [place] [address] any warranty or liability for your use of this information.

## 2020-01-25 NOTE — TELEPHONE ENCOUNTER
----- Message from Mikaela Ortiz sent at 8/26/2019  8:06 AM EDT -----  Regarding: Dr. Liliana Redmond: [phone]  Pt requesting to switch PCP from Dr. Nnamdi Hart to Dr. Kiana Parra.
Please double check with PCP , patient has been seeing Dr Alexander Bassett and would like to switch
when coughing/non-verbal indicators present

## 2020-02-20 DIAGNOSIS — F41.9 ANXIETY: ICD-10-CM

## 2020-02-20 RX ORDER — ESCITALOPRAM OXALATE 20 MG/1
TABLET ORAL
Qty: 90 TAB | Refills: 1 | Status: SHIPPED | OUTPATIENT
Start: 2020-02-20 | End: 2020-06-18

## 2020-06-18 DIAGNOSIS — F41.9 ANXIETY: ICD-10-CM

## 2020-06-18 RX ORDER — ESCITALOPRAM OXALATE 20 MG/1
TABLET ORAL
Qty: 90 TAB | Refills: 1 | Status: SHIPPED | OUTPATIENT
Start: 2020-06-18 | End: 2020-12-17

## 2020-09-21 ENCOUNTER — OFFICE VISIT (OUTPATIENT)
Dept: INTERNAL MEDICINE CLINIC | Age: 35
End: 2020-09-21

## 2020-09-21 VITALS
SYSTOLIC BLOOD PRESSURE: 126 MMHG | OXYGEN SATURATION: 99 % | HEIGHT: 75 IN | DIASTOLIC BLOOD PRESSURE: 79 MMHG | TEMPERATURE: 98 F | BODY MASS INDEX: 23.45 KG/M2 | HEART RATE: 63 BPM | RESPIRATION RATE: 14 BRPM | WEIGHT: 188.6 LBS

## 2020-09-21 DIAGNOSIS — Z13.79 GENETIC SCREENING: ICD-10-CM

## 2020-09-21 DIAGNOSIS — Z01.818 PREOP EXAMINATION: Primary | ICD-10-CM

## 2020-09-21 DIAGNOSIS — H02.403 PTOSIS OF BOTH UPPER EYELIDS: ICD-10-CM

## 2020-09-21 DIAGNOSIS — Z13.220 SCREENING FOR HYPERLIPIDEMIA: ICD-10-CM

## 2020-09-21 LAB
ALBUMIN SERPL-MCNC: 4.7 G/DL (ref 3.5–5)
ALBUMIN/GLOB SERPL: 1.3 {RATIO} (ref 1.1–2.2)
ALP SERPL-CCNC: 52 U/L (ref 45–117)
ALT SERPL-CCNC: 54 U/L (ref 12–78)
ANION GAP SERPL CALC-SCNC: 4 MMOL/L (ref 5–15)
AST SERPL-CCNC: 27 U/L (ref 15–37)
BASOPHILS # BLD: 0 K/UL (ref 0–0.1)
BASOPHILS NFR BLD: 1 % (ref 0–1)
BILIRUB SERPL-MCNC: 0.9 MG/DL (ref 0.2–1)
BUN SERPL-MCNC: 14 MG/DL (ref 6–20)
BUN/CREAT SERPL: 18 (ref 12–20)
CALCIUM SERPL-MCNC: 10.2 MG/DL (ref 8.5–10.1)
CHLORIDE SERPL-SCNC: 103 MMOL/L (ref 97–108)
CHOLEST SERPL-MCNC: 224 MG/DL
CO2 SERPL-SCNC: 31 MMOL/L (ref 21–32)
CREAT SERPL-MCNC: 0.8 MG/DL (ref 0.7–1.3)
DIFFERENTIAL METHOD BLD: ABNORMAL
EOSINOPHIL # BLD: 0.1 K/UL (ref 0–0.4)
EOSINOPHIL NFR BLD: 2 % (ref 0–7)
ERYTHROCYTE [DISTWIDTH] IN BLOOD BY AUTOMATED COUNT: 11.3 % (ref 11.5–14.5)
GLOBULIN SER CALC-MCNC: 3.5 G/DL (ref 2–4)
GLUCOSE SERPL-MCNC: 83 MG/DL (ref 65–100)
HCT VFR BLD AUTO: 46.6 % (ref 36.6–50.3)
HDLC SERPL-MCNC: 90 MG/DL
HDLC SERPL: 2.5 {RATIO} (ref 0–5)
HGB BLD-MCNC: 15.7 G/DL (ref 12.1–17)
IMM GRANULOCYTES # BLD AUTO: 0 K/UL (ref 0–0.04)
IMM GRANULOCYTES NFR BLD AUTO: 1 % (ref 0–0.5)
LDLC SERPL CALC-MCNC: 124 MG/DL (ref 0–100)
LIPID PROFILE,FLP: ABNORMAL
LYMPHOCYTES # BLD: 1.5 K/UL (ref 0.8–3.5)
LYMPHOCYTES NFR BLD: 31 % (ref 12–49)
MCH RBC QN AUTO: 31.4 PG (ref 26–34)
MCHC RBC AUTO-ENTMCNC: 33.7 G/DL (ref 30–36.5)
MCV RBC AUTO: 93.2 FL (ref 80–99)
MONOCYTES # BLD: 0.5 K/UL (ref 0–1)
MONOCYTES NFR BLD: 10 % (ref 5–13)
NEUTS SEG # BLD: 2.7 K/UL (ref 1.8–8)
NEUTS SEG NFR BLD: 55 % (ref 32–75)
NRBC # BLD: 0 K/UL (ref 0–0.01)
NRBC BLD-RTO: 0 PER 100 WBC
PLATELET # BLD AUTO: 230 K/UL (ref 150–400)
PMV BLD AUTO: 10.7 FL (ref 8.9–12.9)
POTASSIUM SERPL-SCNC: 4 MMOL/L (ref 3.5–5.1)
PROT SERPL-MCNC: 8.2 G/DL (ref 6.4–8.2)
RBC # BLD AUTO: 5 M/UL (ref 4.1–5.7)
SODIUM SERPL-SCNC: 138 MMOL/L (ref 136–145)
TRIGL SERPL-MCNC: 50 MG/DL (ref ?–150)
VLDLC SERPL CALC-MCNC: 10 MG/DL
WBC # BLD AUTO: 4.7 K/UL (ref 4.1–11.1)

## 2020-09-21 PROCEDURE — 93000 ELECTROCARDIOGRAM COMPLETE: CPT | Performed by: NURSE PRACTITIONER

## 2020-09-21 PROCEDURE — 99214 OFFICE O/P EST MOD 30 MIN: CPT | Performed by: NURSE PRACTITIONER

## 2020-09-21 NOTE — PROGRESS NOTES
HISTORY OF PRESENT ILLNESS  Bianca Green is a 37 y.o. male. HPI  Bianca Green was referred for evaluation by:Dr. Anant Aquino for Pre- Op Evaluation. Please see encounter details and orders for consultative summary. Type of surgery : Bilateral upper lid functional blepharoplasty  Surgery site : Bilateral eyelids  Anesthesia type: LMA  Date of procedure:  10/1/2020    Allergies: No Known Allergies  Latex allergy: no  Prior reactions to anesthesia:  None    Functional status:  he is able to ambulate up 2 flights of step without shortness of breath, chest pain; walks and plays soccer for exercise  Prior cardiac evaluation:  None    He is requesting labwork to check for Hemachromatosis gene mutation as disease runs in his wife's family and she has tested positive as a carrier. He was advised to be tested as well to determine his children's risk of developing disease. Denies any history of hemachromatosis in his family. Current Outpatient Medications   Medication Sig    escitalopram oxalate (LEXAPRO) 20 mg tablet TAKE 1 TABLET BY MOUTH EVERY DAY    fluticasone propionate (FLONASE) 50 mcg/actuation nasal spray SPRAY 2 SPRAYS INTO EACH NOSTRIL EVERY DAY     No current facility-administered medications for this visit. Past Medical History:   Diagnosis Date    Anxiety     Clavicle fracture     Insomnia     Pain, abdominal, nonspecific     Dr. Subhash Spears.  recurrent pain, diarrhea, nausea.    colon 6/27/12    Seasonal allergies     hx allergy shot    Vertigo      Past Surgical History:   Procedure Laterality Date    ABDOMEN SURGERY PROC UNLISTED  2006    Exploratory lap    COLONOSCOPY  6/27/12    Dr. Subhash Spears    COLONOSCOPY  4/27/06    normal, [place]    HX HEENT      wisdom teeth extraction    HX TONSILLECTOMY       Social History     Tobacco Use    Smoking status: Passive Smoke Exposure - Never Smoker    Smokeless tobacco: Never Used    Tobacco comment: Typically 2 cigarettes   Substance Use Topics    Alcohol use: Yes     Alcohol/week: 8.3 standard drinks     Types: 10 Standard drinks or equivalent per week     Comment: occasionally    Drug use: Never       Blood pressure 126/79, pulse 63, temperature 98 °F (36.7 °C), temperature source Oral, resp. rate 14, height 6' 3\" (1.905 m), weight 188 lb 9.6 oz (85.5 kg), SpO2 99 %. Review of Systems   Constitutional: Negative for chills, fever and malaise/fatigue. HENT: Negative for congestion and sore throat. Eyes: Negative for blurred vision. Respiratory: Negative for cough and shortness of breath. Cardiovascular: Negative for chest pain and palpitations. Gastrointestinal: Negative for abdominal pain, blood in stool, constipation, diarrhea, nausea and vomiting. Genitourinary: Negative for dysuria, frequency, hematuria and urgency. Musculoskeletal: Negative for joint pain and myalgias. Neurological: Negative for dizziness, tingling and headaches. Psychiatric/Behavioral: Negative for depression. Physical Exam  Vitals signs and nursing note reviewed. Constitutional:       General: He is not in acute distress. Appearance: Normal appearance. He is normal weight. HENT:      Head: Normocephalic and atraumatic. Right Ear: Tympanic membrane, ear canal and external ear normal.      Left Ear: Tympanic membrane, ear canal and external ear normal.      Nose: Nose normal.      Mouth/Throat:      Mouth: Mucous membranes are moist.      Pharynx: Oropharynx is clear. Eyes:      Extraocular Movements: Extraocular movements intact. Conjunctiva/sclera: Conjunctivae normal.      Comments: Ptosis bilateral upper eyelids   Neck:      Musculoskeletal: Normal range of motion and neck supple. Cardiovascular:      Rate and Rhythm: Normal rate and regular rhythm. Pulses: Normal pulses. Heart sounds: Normal heart sounds. Pulmonary:      Effort: Pulmonary effort is normal.      Breath sounds: Normal breath sounds.  No wheezing or rhonchi. Abdominal:      General: Abdomen is flat. Bowel sounds are normal.      Palpations: Abdomen is soft. Tenderness: There is no abdominal tenderness. Musculoskeletal: Normal range of motion. General: No swelling. Lymphadenopathy:      Cervical: No cervical adenopathy. Skin:     General: Skin is warm and dry. Findings: No rash. Neurological:      General: No focal deficit present. Mental Status: He is alert and oriented to person, place, and time. Psychiatric:         Mood and Affect: Mood normal.         Behavior: Behavior normal.         ASSESSMENT and PLAN  Diagnoses and all orders for this visit:    1. Preop examination -- considered medically stable for upcoming Bilateral upper eyelid blepharoplasty. -     AMB POC EKG ROUTINE W/ 12 LEADS, INTER & REP  -     CBC WITH AUTOMATED DIFF; Future  -     METABOLIC PANEL, COMPREHENSIVE; Future    2. Ptosis of both upper eyelids    3. Genetic screening  -     HEMOCHROMATOSIS MUTATION; Future    4. Screening for hyperlipidemia  -     LIPID PANEL;  Future      lab results and schedule of future lab studies reviewed with patient  reviewed diet, exercise and weight control  reviewed medications and side effects in detail

## 2020-09-21 NOTE — PATIENT INSTRUCTIONS
Eyelid Surgery: Before Your Surgery What is eyelid surgery? There are two types of eyelid surgery. You may have one or both types of surgery. These surgeries can be done on one or both of your eyes. Surgery for ptosis lifts droopy upper eyelids. Ptosis (say \"ZACKERY-ren\") is the name for eyelids that droop. The eyelid muscles or tendons do not work as they should. This can affect your vision and your appearance. It can be caused by aging, nerve or muscle problems, eye surgery, or an injury. You can be born with this problem, or you can get it later in life. The doctor makes a small cut in the crease of your upper eyelid. The cut is called an incision. He or she then lifts the eyelid by tightening the muscle that raises your eyelid. In rare cases, the muscle is too weak to tighten. In that case, the doctor will connect your forehead muscles to your eyelid muscles. After fixing the problem, the doctor stitches up the incision. Minor cases may not need a cut in the skin. Blepharoplasty (say \"BLSB-io-xhd-plass-tran\") is surgery to remove baggy, extra tissue on your upper or lower eyelids. In most cases, this extra tissue forms as you age. It may affect your vision. But more often, it affects how you look. This surgery is usually considered cosmetic, or plastic, surgery. The doctor makes small incisions in the creases of your upper eyelids and just below the lashes of your lower eyelids. The doctor removes extra tissue through the incisions. The doctor then stitches the incisions closed. During either surgery, you will get medicine so you will not feel pain. You may get medicine that relaxes you or puts you into a light sleep. Eyelid surgery takes about 1 to 2 hours. You will probably go home on the same day as your surgery. After surgery, you will have tiny scars. These scars will fade over time.  
Most people feel ready to go out in public and back to work in about 7 to 10 days. After either surgery, you may be able to see better. You may like how the surgery affects your appearance. Follow-up care is a key part of your treatment and safety. Be sure to make and go to all appointments, and call your doctor if you are having problems. It's also a good idea to know your test results and keep a list of the medicines you take. How do you prepare for surgery? Surgery can be stressful. This information will help you understand what you can expect. And it will help you safely prepare for surgery. Preparing for surgery 
  · Be sure you have someone to take you home. Anesthesia and pain medicine will make it unsafe for you to drive or get home on your own.  
  · Understand exactly what surgery is planned, along with the risks, benefits, and other options.  
  · If you take aspirin or some other blood thinner, ask your doctor if you should stop taking it before your surgery. Make sure that you understand exactly what your doctor wants you to do. These medicines increase the risk of bleeding.  
  · Tell your doctor ALL the medicines, vitamins, supplements, and herbal remedies you take. Some may increase the risk of problems during your surgery. Your doctor will tell you if you should stop taking any of them before the surgery and how soon to do it.  
  · Make sure your doctor and the hospital have a copy of your advance directive. If you don't have one, you may want to prepare one. It lets others know your health care wishes. It's a good thing to have before any type of surgery or procedure. What happens on the day of surgery? · Follow the instructions exactly about when to stop eating and drinking. If you don't, your surgery may be canceled. If your doctor told you to take your medicines on the day of surgery, take them with only a sip of water.  
  · Take a bath or shower before you come in for your surgery. Do not apply lotions, perfumes, deodorants, or nail polish.   · Do not shave the surgical site yourself.  
  · Take off all jewelry and piercings. And take out contact lenses, if you wear them. At the hospital or surgery center · Bring a picture ID.  
  · The area for surgery is often marked to make sure there are no errors.  
  · You will be kept comfortable and safe by your anesthesia provider. You may get medicine that relaxes you or puts you in a light sleep. The area being worked on will be numb.  
  · The surgery will take about 1 to 2 hours.  
  · You may have a bandage over your eye. If you had surgery for ptosis, your lower lid may be taped to your forehead. This protects your eye from the bandage. You may also have an ice pack over your eye to prevent swelling. When should you call your doctor? · You have questions or concerns.  
  · You don't understand how to prepare for your surgery.  
  · You become ill before the surgery (such as fever, flu, or a cold).  
  · You need to reschedule or have changed your mind about having the surgery. Where can you learn more? Go to http://nicole-prasanth.info/ Enter 54245 [phone] [id number] [phone] in the search box to learn more about \"Eyelid Surgery: Before Your Surgery. \" Current as of: July 2, 2020               Content Version: 12.6 © 0519-7935 [employer]. Care instructions adapted under license by [employer] (which disclaims liability or warranty for this information). If you have questions about a medical condition or this instruction, always ask your healthcare professional. [place] [address] any warranty or liability for your use of this information. Hemochromatosis: Care Instructions Your Care Instructions Hemochromatosis is the buildup of iron in the body. The iron collects in the blood, liver, heart, pancreas, joints, skin, and other organs.  
Hemochromatosis is a disease that can be passed from a parent to a child (inherited). Usually, people need an abnormal gene from each parent to get the disease. You and your family members can get a genetic test to see if the gene is present. This is important, because early treatment that removes the extra iron from your body can protect your health. Hemochromatosis may cause belly pain, weakness, tiredness, and weight loss. It also can scar the liver, cause joint pain, and darken the skin. In late stages, it can damage the heart and joints, and can cause diabetes. Symptoms of hemochromatosis often do not appear until a person is 36to 61years old. Treatment removes the excess iron from the body before it causes problems. This can be done by taking blood out of your veins every week at first. Later, most people have this done every 2 or 3 months. Once in a while, doctors prescribe medicine that removes iron from the body. If you have hemochromatosis, your family members should tell their doctors. They will have to watch for the condition as well. Early treatment can help prevent problems later. Follow-up care is a key part of your treatment and safety. Be sure to make and go to all appointments, and call your doctor if you are having problems. It's also a good idea to know your test results and keep a list of the medicines you take. How can you care for yourself at home? · Be careful about the vitamins and supplements that you take. You should avoid vitamins or supplements that have iron and vitamin C. Vitamin C supplements cause your body to absorb more iron from food. But it is okay to eat foods that have vitamin C (such as oranges). · Ask your doctor if you need to make any changes in your diet. If you need to reduce the amount of iron in your diet, eat less red meats and organ meats, which contain high amounts of iron. Avoid iron-fortified food, such as some breads and cereals. · Drink tea and coffee.  These drinks can cause your body to absorb less iron from the food you eat. Drinking these beverages does not replace usual treatment. · Do not use iron cookware. Food cooked in [place] can absorb some of the iron. · Get hepatitis A and hepatitis B shots. These vaccines can protect your liver. · Limit or do not drink alcohol. Alcohol can cause more damage to your liver. · Be careful when you eat seafood. You may be more likely to get sick if you eat undercooked seafood. When should you call for help? Call 911 anytime you think you may need emergency care. For example, call if: 
  · You have trouble breathing.  
  · You vomit blood or what looks like coffee grounds. Call your doctor now or seek immediate medical care if: 
  · You feel very sleepy or confused.  
  · You have new or worse belly pain.  
  · You have a fever.  
  · There is a new or increasing yellow tint to your skin or the whites of your eyes.  
  · You have any abnormal bleeding, such as: 
? Nosebleeds. ? Vaginal bleeding that is different (heavier, more frequent, at a different time of the month) than what you are used to. 
? Bloody or black stools, or rectal bleeding. ? Bloody or pink urine. Watch closely for changes in your health, and be sure to contact your doctor if: 
  · You have any problems.  
  · You are gaining weight.  
  · Your belly is getting bigger. Where can you learn more? Go to http://nicole-prasanth.info/ Enter E422 in the search box to learn more about \"Hemochromatosis: Care Instructions. \" Current as of: November 8, 2019               Content Version: 12.6 © 1032-4147 [employer]. Care instructions adapted under license by [employer] (which disclaims liability or warranty for this information). If you have questions about a medical condition or this instruction, always ask your healthcare professional. [address] any warranty or liability for your use of this information.

## 2020-09-28 LAB — HFE GENE MUT ANL BLD/T: NORMAL

## 2020-11-19 NOTE — PROGRESS NOTES
Zechariah Bartlett M.D.  [phone]           GI PROGRESS NOTE        NAME: Viki Gonzalez   :  1985   MRN:  [id number]       Subjective:   Reports feeling better than 2 days ago. Had one watery stool today. Denies nausea or vomiting. Tolerated clears. Objective: In NAD      VITALS:   Last 24hrs VS reviewed since prior progress note. Most recent are:  Visit Vitals    /76 (BP 1 Location: Right arm, BP Patient Position: At rest)    Pulse (!) 58    Temp 97.5 °F (36.4 °C)    Resp 16    Ht 6' 4\" (1.93 m)    Wt 83 kg (183 lb)    SpO2 100%    BMI 22.28 kg/m2       Intake/Output Summary (Last 24 hours) at 18 1541  Last data filed at 18 0912   Gross per 24 hour   Intake          3210.42 ml   Output             1350 ml   Net          1860.42 ml       PHYSICAL EXAM:  General: Alert, in no acute distress    HEENT: Anicteric sclerae. Lungs:            CTA Bilaterally. Heart:  Regular  rhythm,    Abdomen: Soft, mildly distended and tender. hypoactive Bowel sounds, no HSM  Extremities: No c/c/e  Neurologic:  CN 2-12 gi, Alert and oriented X 3. No acute neurological distress   Psych:   Good insight. Not anxious nor agitated.     Lab Data Reviewed:   Recent Labs      18   0541  18   0310   WBC  5.6  7.7   HGB  13.5  15.1   HCT  40.3  44.5   PLT  166  204     Recent Labs      18   0541  18   0310   NA  140  138   K  4.0  3.8   CL  108  105   CO2  26  26   BUN  5*  9   CREA  0.72  0.68*   GLU  93  135*   PHOS  2.8   --    CA  8.6  8.6     Recent Labs      18   SGOT  23   AP  54   TP  8.4*   ALB  4.3   GLOB  4.1*   LPSE  156       ________________________________________________________________________  Patient Active Problem List   Diagnosis Code    Pain, abdominal, nonspecific R10.9    Insomnia G47.00    Small bowel obstruction ([facility] [address] K56.600         Assessment and Plan:  Small bowel obstruction concerning for colon mass, hypertensive urgency underlying Crohn's disease, improving with current management which I will continue. Will need evaluation with EGD, colonoscopy and likely small bowel series once acute episode has resolved, hopefully as outpatient. Noted mild elevation of C-RP, will order fecal calprotectin with next bowel movement.        Signed By: Miranda Callaway MD     5/26/2018  3:41 PM

## 2020-12-30 ENCOUNTER — VIRTUAL VISIT (OUTPATIENT)
Dept: INTERNAL MEDICINE CLINIC | Age: 35
End: 2020-12-30
Payer: COMMERCIAL

## 2020-12-30 DIAGNOSIS — J01.00 ACUTE NON-RECURRENT MAXILLARY SINUSITIS: Primary | ICD-10-CM

## 2020-12-30 PROCEDURE — 99213 OFFICE O/P EST LOW 20 MIN: CPT | Performed by: INTERNAL MEDICINE

## 2020-12-30 RX ORDER — AMOXICILLIN AND CLAVULANATE POTASSIUM 875; 125 MG/1; MG/1
1 TABLET, FILM COATED ORAL EVERY 12 HOURS
Qty: 20 TAB | Refills: 0 | Status: SHIPPED | OUTPATIENT
Start: 2020-12-30 | End: 2021-08-27 | Stop reason: ALTCHOICE

## 2020-12-30 NOTE — PROGRESS NOTES
Consent: Fernanda Yu, who was seen by synchronous (real-time) audio-video technology, and/or his healthcare decision maker, is aware that this patient-initiated, Telehealth encounter on 12/30/2020 is a billable service, with coverage as determined by his insurance carrier. He is aware that he may receive a bill and has provided verbal consent to proceed: YES  712  Subjective: Fernanda Yu is a 38 y.o. male who was seen for Cold Symptoms      He has a history of sinusitis, roughly once or twice a year. Notes symptoms that are reminiscent of previous episodes with yellow-green nasal drainage, congestion, facial pressure. No fevers, no shortness of breath, no cough, no ear pain. No one else in the house is sick and he has very low risk of exposure. He and wife work from home and have only seen family members who have been in their bubble for months. Children are not having any symptoms. Did discuss indications for COVID testing, at this point will treat for presumed sinusitis. Current Outpatient Medications   Medication Sig    amoxicillin-clavulanate (AUGMENTIN) 875-125 mg per tablet Take 1 Tab by mouth every twelve (12) hours.  escitalopram oxalate (LEXAPRO) 20 mg tablet TAKE 1 TABLET BY MOUTH EVERY DAY     No current facility-administered medications for this visit. No Known Allergies    Past Medical History:   Diagnosis Date    Anxiety     Clavicle fracture     Insomnia     Pain, abdominal, nonspecific     Dr. Triston Alaniz.  recurrent pain, diarrhea, nausea. colon 6/27/12    Seasonal allergies     hx allergy shot    Vertigo        ROS  All other systems reviewed and negative, unless mentioned in HPI    Objective:   Vital Signs: (As obtained by patient/caregiver at home)  There were no vitals taken for this visit.      [INSTRUCTIONS:  \"[x]\" Indicates a positive item  \"[]\" Indicates a negative item  -- DELETE ALL ITEMS NOT EXAMINED]    Constitutional: [x] Appears well-developed and well-nourished [x] No apparent distress      [] Abnormal -     Mental status: [x] Alert and awake  [x] Oriented to person/place/time [x] Able to follow commands    [] Abnormal -     Eyes:   EOM    [x]  Normal    [] Abnormal -   Sclera  [x]  Normal    [] Abnormal -          Discharge [x]  None visible   [] Abnormal -     HENT: [x] Normocephalic, atraumatic  [] Abnormal -       External Ears [x] Normal  [] Abnormal -    Neck: [x] No visualized mass [] Abnormal -     Pulmonary/Chest: [x] Respiratory effort normal   [x] No visualized signs of difficulty breathing or respiratory distress        [] Abnormal -      Musculoskeletal:            [x] Normal range of motion of neck        [] Abnormal -     Neurological:        [x] No Facial Asymmetry (Cranial nerve 7 motor function) (limited exam due to video visit)          [x] No gaze palsy        [] Abnormal -          Skin:        [x] No significant exanthematous lesions or discoloration noted on facial skin         [] Abnormal -            Psychiatric:       [x] Normal Affect [] Abnormal -           Other pertinent observable physical exam findings:-        Assessment & Plan:   Diagnoses and all orders for this visit:    1. Acute non-recurrent maxillary sinusitis  -     amoxicillin-clavulanate (AUGMENTIN) 875-125 mg per tablet; Take 1 Tab by mouth every twelve (12) hours. Discussed flonase and mucinex   Discussed indications for covid testing        We discussed the expected course, resolution and complications of the diagnosis(es) in detail. Medication risks, benefits, costs, interactions, and alternatives were discussed as indicated. I advised him to contact the office if his condition worsens, changes or fails to improve as anticipated. He expressed understanding with the diagnosis(es) and plan. Cait Maurer is a 38 y.o. male being evaluated by a video visit encounter for concerns as above. A caregiver was present when appropriate.  Due to this being a TeleHealth encounter (During IMZIU-21 public health emergency), evaluation of the following organ systems was limited: Vitals/Constitutional/EENT/Resp/CV/GI//MS/Neuro/Skin/Heme-Lymph-Imm. Pursuant to the emergency declaration under the [address] [place] [address], [facility] waiver authority and the [employer] and [employer] Act, this Virtual  Visit was conducted, with patient's (and/or legal guardian's) consent, to reduce the patient's risk of exposure to COVID-19 and provide necessary medical care. Services were provided through a video synchronous discussion virtually to substitute for in-person clinic visit. Patient and provider were located at their individual homes.

## 2021-02-10 ENCOUNTER — TELEPHONE (OUTPATIENT)
Dept: INTERNAL MEDICINE CLINIC | Age: 36
End: 2021-02-10

## 2021-02-10 NOTE — TELEPHONE ENCOUNTER
Per patient's wife, they  havr a flight to catch on 02/24 but he needs to have a Covid Test and her results 5 day prior to take off, states per testing facilities an order or ref from PCP for the test is required being he  has not been exposed nor has any s/s .      States she is trying to schedule his and her  test on 2/19,       She can be reached at [phone]

## 2021-02-10 NOTE — TELEPHONE ENCOUNTER
Wife states they will be going to [facility] for the testing and they do not require PCP authorization. If anything changes, she states she will let us know thru My Chart.

## 2021-08-16 DIAGNOSIS — F41.9 ANXIETY: ICD-10-CM

## 2021-08-17 RX ORDER — ESCITALOPRAM OXALATE 20 MG/1
TABLET ORAL
Qty: 30 TABLET | Refills: 0 | OUTPATIENT
Start: 2021-08-17

## 2021-08-17 RX ORDER — ESCITALOPRAM OXALATE 20 MG/1
20 TABLET ORAL DAILY
Qty: 15 TABLET | Refills: 0 | Status: SHIPPED | OUTPATIENT
Start: 2021-08-17 | End: 2021-08-27 | Stop reason: SDUPTHER

## 2021-08-27 ENCOUNTER — OFFICE VISIT (OUTPATIENT)
Dept: INTERNAL MEDICINE CLINIC | Age: 36
End: 2021-08-27
Payer: COMMERCIAL

## 2021-08-27 VITALS
OXYGEN SATURATION: 98 % | HEART RATE: 77 BPM | RESPIRATION RATE: 18 BRPM | HEIGHT: 75 IN | BODY MASS INDEX: 23.82 KG/M2 | WEIGHT: 191.6 LBS | DIASTOLIC BLOOD PRESSURE: 72 MMHG | SYSTOLIC BLOOD PRESSURE: 110 MMHG | TEMPERATURE: 98.1 F

## 2021-08-27 DIAGNOSIS — F41.9 ANXIETY: ICD-10-CM

## 2021-08-27 PROCEDURE — 99213 OFFICE O/P EST LOW 20 MIN: CPT | Performed by: INTERNAL MEDICINE

## 2021-08-27 RX ORDER — ESCITALOPRAM OXALATE 20 MG/1
20 TABLET ORAL DAILY
Qty: 90 TABLET | Refills: 6 | Status: SHIPPED | OUTPATIENT
Start: 2021-08-27 | End: 2022-10-05

## 2021-08-27 NOTE — PROGRESS NOTES
Shantel Smith is a 38 y.o. male who presents today for Medication Refill (RM19// pt is presenting today for refill of Lexapro)  . He has a history of   Patient Active Problem List   Diagnosis Code    Pain, abdominal, nonspecific R10.9    Insomnia G47.00    Small bowel obstruction ([facility] [address] K56.609   . Today patient is here for follow-up. Anxiety-patient has been on Lexapro for some time. Patient reports that his anxiety is doing well. Patient is seen for anxiety disorder. Current treatment includes SSRI and no other therapies. Ongoing symptoms include: none. Patient denies: sweating, chest pain, dizziness, paresthesias, racing thoughts, feelings of losing control, difficulty concentrating, suicidal ideation. Denies substance or alcohol abuse. Reported side effects from the treatment: none. ROS  Review of Systems   Constitutional: Negative for chills, fever and weight loss. HENT: Negative for congestion, ear discharge, hearing loss, nosebleeds, sore throat and tinnitus. Eyes: Negative for blurred vision, double vision, photophobia and pain. Respiratory: Negative for cough and shortness of breath. Cardiovascular: Negative for chest pain, palpitations, orthopnea and leg swelling. Gastrointestinal: Negative for abdominal pain, constipation, diarrhea, heartburn, nausea and vomiting. Genitourinary: Negative for dysuria, frequency and urgency. Musculoskeletal: Negative for joint pain and myalgias. Skin: Negative for rash. Neurological: Negative. Negative for headaches. Endo/Heme/Allergies: Does not bruise/bleed easily. Psychiatric/Behavioral: Negative for memory loss and suicidal ideas. Visit Vitals  /72   Pulse 77   Temp 98.1 °F (36.7 °C) (Oral)   Resp 18   Ht 6' 3\" (1.905 m)   Wt 191 lb 9.6 oz (86.9 kg)   SpO2 98%   BMI 23.95 kg/m²       Physical Exam  Constitutional:       Appearance: He is well-developed. He is not diaphoretic.    HENT:      Head: Normocephalic and atraumatic. Right Ear: Tympanic membrane normal.      Left Ear: Tympanic membrane normal.      Nose: Nose normal.   Eyes:      Pupils: Pupils are equal, round, and reactive to light. Neck:      Vascular: No JVD. Cardiovascular:      Rate and Rhythm: Normal rate and regular rhythm. Heart sounds: No murmur heard. Pulmonary:      Effort: Pulmonary effort is normal. No respiratory distress. Breath sounds: Normal breath sounds. No wheezing. Abdominal:      General: Bowel sounds are normal. There is no distension. Palpations: Abdomen is soft. Tenderness: There is no abdominal tenderness. Musculoskeletal:         General: Normal range of motion. Cervical back: Normal range of motion and neck supple. Skin:     General: Skin is warm and dry. Neurological:      Mental Status: He is alert and oriented to person, place, and time. Cranial Nerves: No cranial nerve deficit. Psychiatric:         Behavior: Behavior normal.           Current Outpatient Medications   Medication Sig    escitalopram oxalate (LEXAPRO) 20 mg tablet Take 1 Tablet by mouth daily. No current facility-administered medications for this visit. Past Medical History:   Diagnosis Date    Anxiety     Clavicle fracture     Insomnia     Pain, abdominal, nonspecific     Dr. Ladan Yates.  recurrent pain, diarrhea, nausea. colon 6/27/12    Seasonal allergies     hx allergy shot    Vertigo       Past Surgical History:   Procedure Laterality Date    COLONOSCOPY  6/27/12    Dr. Nieves Fritz COLONOSCOPY  4/27/06    normal, [place]    HX HEENT      wisdom teeth extraction    HX TONSILLECTOMY      ID ABDOMEN SURGERY PROC UNLISTED  2006    Exploratory lap      Social History     Tobacco Use    Smoking status: Passive Smoke Exposure - Never Smoker    Smokeless tobacco: Never Used    Tobacco comment: Typically 2 cigarettes   Substance Use Topics    Alcohol use:  Yes     Alcohol/week: 8.3 standard drinks Types: 10 Standard drinks or equivalent per week     Comment: occasionally      Family History   Problem Relation Age of Onset    No Known Problems Mother     No Known Problems Father     No Known Problems Sister     Crohn's Disease Brother     No Known Problems Daughter     No Known Problems Son     Cancer Neg Hx     Diabetes Neg Hx     Heart Disease Neg Hx     High Cholesterol Neg Hx     Stroke Neg Hx         No Known Allergies     Assessment/Plan  Diagnoses and all orders for this visit:    1. Anxiety-refill Lexapro. We discussed that we do not need to see him back for 2 years. We did answer some questions regarding CBD oil medical marijuana. -     escitalopram oxalate (LEXAPRO) 20 mg tablet; Take 1 Tablet by mouth daily. Skyler Mendoza MD  8/27/2021    This note was created with the help of speech recognition software Johnna Dumas) and may contain some 'sound alike' errors.

## 2021-08-27 NOTE — PROGRESS NOTES
Joyce Motta  Identified pt with two pt identifiers(name and ). Chief Complaint   Patient presents with    Medication Refill     RM19// pt is presenting today for refill of Lexapro       1. Have you been to the ER, urgent care clinic since your last visit? Hospitalized since your last visit? NO    2. Have you seen or consulted any other health care providers outside of the [address] [place] [address] since your last visit? Include any pap smears or colon screening. NO      Provider notified of reason for visit, vitals and flowsheets obtained on patients.      Patient received paperwork for advance directive during previous visit but has not completed at this time     Reviewed record In preparation for visit, huddled with provider and have obtained necessary documentation      Health Maintenance Due   Topic    DTaP/Tdap/Td series (1 - Tdap)       Wt Readings from Last 3 Encounters:   21 191 lb 9.6 oz (86.9 kg)   20 188 lb 9.6 oz (85.5 kg)   19 181 lb (82.1 kg)     Temp Readings from Last 3 Encounters:   21 98.1 °F (36.7 °C) (Oral)   20 98 °F (36.7 °C) (Oral)   19 98.4 °F (36.9 °C) (Oral)     BP Readings from Last 3 Encounters:   21 (!) 120/90   20 126/79   19 120/82     Pulse Readings from Last 3 Encounters:   21 77   20 63   19 63     Vitals:    21 1026   BP: (!) 120/90   Pulse: 77   Resp: 18   Temp: 98.1 °F (36.7 °C)   TempSrc: Oral   SpO2: 98%   Weight: 191 lb 9.6 oz (86.9 kg)   Height: 6' 3\" (1.905 m)   PainSc:   0 - No pain         Learning Assessment:  :     Learning Assessment 2020   PRIMARY LEARNER Patient   PRIMARY LANGUAGE ENGLISH   LEARNER PREFERENCE PRIMARY READING   ANSWERED BY PATIENT   RELATIONSHIP SELF       Depression Screening:  :     3 most recent PHQ Screens 2021   Little interest or pleasure in doing things Not at all   Feeling down, depressed, irritable, or hopeless Not at all   Total Score PHQ 2 0 Fall Risk Assessment:  :     Fall Risk Assessment, last 12 mths 9/21/2020   Able to walk? Yes   Fall in past 12 months? No       Abuse Screening:  :     Abuse Screening Questionnaire 9/21/2020 8/26/2019 9/10/2018 8/10/2018   Do you ever feel afraid of your partner? N N N N   Are you in a relationship with someone who physically or mentally threatens you? N N N N   Is it safe for you to go home? Y Y Y Y       ADL Screening:  :     ADL Assessment 9/21/2020   Feeding yourself No Help Needed   Getting from bed to chair No Help Needed   Getting dressed No Help Needed   Bathing or showering No Help Needed   Walk across the room (includes cane/walker) No Help Needed   Using the telphone No Help Needed   Taking your medications No Help Needed   Preparing meals No Help Needed   Managing money (expenses/bills) No Help Needed   Moderately strenuous housework (laundry) No Help Needed   Shopping for personal items (toiletries/medicines) No Help Needed   Shopping for groceries No Help Needed   Driving No Help Needed   Climbing a flight of stairs No Help Needed   Getting to places beyond walking distances No Help Needed         Medication reconciliation up to date and corrected with patient at this time.

## 2022-03-18 PROBLEM — K56.609 SMALL BOWEL OBSTRUCTION (HCC): Status: ACTIVE | Noted: 2018-05-25

## 2022-10-05 DIAGNOSIS — F41.9 ANXIETY: ICD-10-CM

## 2022-10-05 RX ORDER — ESCITALOPRAM OXALATE 20 MG/1
TABLET ORAL
Qty: 90 TABLET | Refills: 6 | Status: SHIPPED | OUTPATIENT
Start: 2022-10-05

## 2022-11-29 RX ORDER — CYCLOBENZAPRINE HCL 10 MG
10 TABLET ORAL
Qty: 21 TABLET | Refills: 0 | Status: SHIPPED | OUTPATIENT
Start: 2022-11-29

## 2023-05-25 RX ORDER — ESCITALOPRAM OXALATE 20 MG/1
1 TABLET ORAL DAILY
COMMUNITY
Start: 2022-10-05

## 2023-05-25 RX ORDER — CYCLOBENZAPRINE HCL 10 MG
10 TABLET ORAL 3 TIMES DAILY PRN
COMMUNITY
Start: 2022-11-29

## 2023-10-03 ENCOUNTER — OFFICE VISIT (OUTPATIENT)
Age: 38
End: 2023-10-03
Payer: COMMERCIAL

## 2023-10-03 VITALS
SYSTOLIC BLOOD PRESSURE: 123 MMHG | DIASTOLIC BLOOD PRESSURE: 89 MMHG | HEIGHT: 75 IN | TEMPERATURE: 97.4 F | BODY MASS INDEX: 23.75 KG/M2 | WEIGHT: 191 LBS | OXYGEN SATURATION: 98 % | RESPIRATION RATE: 18 BRPM | HEART RATE: 81 BPM

## 2023-10-03 DIAGNOSIS — J32.9 SINUSITIS, UNSPECIFIED CHRONICITY, UNSPECIFIED LOCATION: Primary | ICD-10-CM

## 2023-10-03 PROCEDURE — 99213 OFFICE O/P EST LOW 20 MIN: CPT | Performed by: INTERNAL MEDICINE

## 2023-10-03 RX ORDER — GUAIFENESIN 600 MG/1
600 TABLET, EXTENDED RELEASE ORAL 2 TIMES DAILY
COMMUNITY

## 2023-10-03 RX ORDER — AMOXICILLIN AND CLAVULANATE POTASSIUM 875; 125 MG/1; MG/1
1 TABLET, FILM COATED ORAL 2 TIMES DAILY
Qty: 20 TABLET | Refills: 0 | Status: SHIPPED | OUTPATIENT
Start: 2023-10-03 | End: 2023-10-13

## 2023-10-03 SDOH — ECONOMIC STABILITY: TRANSPORTATION INSECURITY
IN THE PAST 12 MONTHS, HAS LACK OF TRANSPORTATION KEPT YOU FROM MEETINGS, WORK, OR FROM GETTING THINGS NEEDED FOR DAILY LIVING?: NO

## 2023-10-03 SDOH — ECONOMIC STABILITY: FOOD INSECURITY: WITHIN THE PAST 12 MONTHS, YOU WORRIED THAT YOUR FOOD WOULD RUN OUT BEFORE YOU GOT MONEY TO BUY MORE.: NEVER TRUE

## 2023-10-03 SDOH — ECONOMIC STABILITY: FOOD INSECURITY: WITHIN THE PAST 12 MONTHS, THE FOOD YOU BOUGHT JUST DIDN'T LAST AND YOU DIDN'T HAVE MONEY TO GET MORE.: NEVER TRUE

## 2023-10-03 SDOH — ECONOMIC STABILITY: HOUSING INSECURITY
IN THE LAST 12 MONTHS, WAS THERE A TIME WHEN YOU DID NOT HAVE A STEADY PLACE TO SLEEP OR SLEPT IN A SHELTER (INCLUDING NOW)?: NO

## 2023-10-03 SDOH — ECONOMIC STABILITY: INCOME INSECURITY: HOW HARD IS IT FOR YOU TO PAY FOR THE VERY BASICS LIKE FOOD, HOUSING, MEDICAL CARE, AND HEATING?: NOT HARD AT ALL

## 2023-10-03 ASSESSMENT — ENCOUNTER SYMPTOMS
FACIAL SWELLING: 0
SINUS PRESSURE: 1
SHORTNESS OF BREATH: 0
CONSTIPATION: 0
SINUS PAIN: 1
WHEEZING: 0
BACK PAIN: 0
DIARRHEA: 0
ABDOMINAL PAIN: 0

## 2023-10-03 ASSESSMENT — PATIENT HEALTH QUESTIONNAIRE - PHQ9
SUM OF ALL RESPONSES TO PHQ QUESTIONS 1-9: 0
SUM OF ALL RESPONSES TO PHQ9 QUESTIONS 1 & 2: 0
2. FEELING DOWN, DEPRESSED OR HOPELESS: 0
1. LITTLE INTEREST OR PLEASURE IN DOING THINGS: 0

## 2023-11-26 DIAGNOSIS — F41.9 ANXIETY DISORDER, UNSPECIFIED: ICD-10-CM

## 2023-11-27 RX ORDER — ESCITALOPRAM OXALATE 20 MG/1
20 TABLET ORAL DAILY
Qty: 90 TABLET | Refills: 6 | Status: SHIPPED | OUTPATIENT
Start: 2023-11-27

## 2024-02-12 RX ORDER — SCOLOPAMINE TRANSDERMAL SYSTEM 1 MG/1
1 PATCH, EXTENDED RELEASE TRANSDERMAL
Qty: 3 PATCH | Refills: 0 | Status: SHIPPED | OUTPATIENT
Start: 2024-02-12

## 2024-02-14 NOTE — PROGRESS NOTES
5/25/2018  2:29 PM  Reason for Admission:   Small Bowel Obstruction                   RRAT Score:          0           Plan for utilizing home health:      No HH needs indicated. Likelihood of Readmission:  green                         Transition of Care Plan:                      CM met with pt for assessment. Demographics and PCP were confirmed. Pt is a 27year old,  male who lives in a private residence with his wife (3 exterior steps, 1 flight interior steps). PTA, pt was able to complete ADLs without the use of DME. Pt has prescription drug coverage, and prefers [facility]. No discharge service needs indicated. Pt's wife will provide transport upon discharge. Jan Guillen MA    Care Management Interventions  PCP Verified by CM: Yes [facility])  Palliative Care Criteria Met (RRAT>21 & CHF Dx)?: No  Mode of Transport at Discharge:  Other (see comment) (Wife)  MyChart Signup: No  Discharge Durable Medical Equipment: No  Physical Therapy Consult: No  Occupational Therapy Consult: No  Speech Therapy Consult: No  Current Support Network: Lives with Spouse  Confirm Follow Up Transport: Family  Plan discussed with Pt/Family/Caregiver: Yes  Discharge Location  Discharge Placement: Home with family assistance Sridevi Harris RN

## 2024-12-03 DIAGNOSIS — F41.9 ANXIETY DISORDER, UNSPECIFIED: ICD-10-CM

## 2024-12-03 RX ORDER — ESCITALOPRAM OXALATE 20 MG/1
20 TABLET ORAL DAILY
Qty: 90 TABLET | Refills: 6 | OUTPATIENT
Start: 2024-12-03

## 2025-04-06 DIAGNOSIS — F41.9 ANXIETY DISORDER, UNSPECIFIED: ICD-10-CM

## 2025-04-07 RX ORDER — ESCITALOPRAM OXALATE 20 MG/1
20 TABLET ORAL DAILY
Qty: 90 TABLET | Refills: 6 | OUTPATIENT
Start: 2025-04-07

## 2025-04-10 DIAGNOSIS — F41.9 ANXIETY DISORDER, UNSPECIFIED: ICD-10-CM

## 2025-04-10 RX ORDER — ESCITALOPRAM OXALATE 20 MG/1
20 TABLET ORAL DAILY
Qty: 90 TABLET | Refills: 6 | OUTPATIENT
Start: 2025-04-10

## 2025-04-11 ENCOUNTER — OFFICE VISIT (OUTPATIENT)
Facility: CLINIC | Age: 40
End: 2025-04-11
Payer: COMMERCIAL

## 2025-04-11 VITALS
TEMPERATURE: 98.1 F | HEIGHT: 75 IN | BODY MASS INDEX: 25.36 KG/M2 | HEART RATE: 86 BPM | RESPIRATION RATE: 16 BRPM | DIASTOLIC BLOOD PRESSURE: 70 MMHG | SYSTOLIC BLOOD PRESSURE: 110 MMHG | OXYGEN SATURATION: 98 % | WEIGHT: 204 LBS

## 2025-04-11 DIAGNOSIS — F41.9 ANXIETY DISORDER, UNSPECIFIED TYPE: ICD-10-CM

## 2025-04-11 DIAGNOSIS — L30.9 DERMATITIS: Primary | ICD-10-CM

## 2025-04-11 DIAGNOSIS — R03.0 ELEVATED BP WITHOUT DIAGNOSIS OF HYPERTENSION: ICD-10-CM

## 2025-04-11 PROCEDURE — 99214 OFFICE O/P EST MOD 30 MIN: CPT | Performed by: INTERNAL MEDICINE

## 2025-04-11 RX ORDER — ESCITALOPRAM OXALATE 20 MG/1
20 TABLET ORAL DAILY
Qty: 90 TABLET | Refills: 6 | Status: SHIPPED | OUTPATIENT
Start: 2025-04-11

## 2025-04-11 RX ORDER — PREDNISONE 20 MG/1
TABLET ORAL
Qty: 14 TABLET | Refills: 0 | Status: SHIPPED | OUTPATIENT
Start: 2025-04-11

## 2025-04-11 SDOH — ECONOMIC STABILITY: FOOD INSECURITY: WITHIN THE PAST 12 MONTHS, YOU WORRIED THAT YOUR FOOD WOULD RUN OUT BEFORE YOU GOT MONEY TO BUY MORE.: NEVER TRUE

## 2025-04-11 SDOH — ECONOMIC STABILITY: FOOD INSECURITY: WITHIN THE PAST 12 MONTHS, THE FOOD YOU BOUGHT JUST DIDN'T LAST AND YOU DIDN'T HAVE MONEY TO GET MORE.: NEVER TRUE

## 2025-04-11 ASSESSMENT — ENCOUNTER SYMPTOMS
SHORTNESS OF BREATH: 0
EYE ITCHING: 0
WHEEZING: 0
EYE DISCHARGE: 0
DIARRHEA: 0
ABDOMINAL PAIN: 0
EYE PAIN: 0
FACIAL SWELLING: 0
SORE THROAT: 0
COLOR CHANGE: 0
CONSTIPATION: 0
BACK PAIN: 0

## 2025-04-11 ASSESSMENT — PATIENT HEALTH QUESTIONNAIRE - PHQ9
1. LITTLE INTEREST OR PLEASURE IN DOING THINGS: NOT AT ALL
2. FEELING DOWN, DEPRESSED OR HOPELESS: NOT AT ALL
SUM OF ALL RESPONSES TO PHQ QUESTIONS 1-9: 0

## 2025-04-11 NOTE — PROGRESS NOTES
Gunnar Shetty is a 39 y.o. male who presents today for Medication Refill and Rash (Rash on right inner arm; present for about a week; pt has been apply otc hydrocortisone cream and calamine lotion; not helpful; mild itchiness )  .      He has a history of   Patient Active Problem List   Diagnosis    Small bowel obstruction (HCC)    Insomnia    Pain, abdominal, nonspecific   .    Today patient is here for follow up.     History of Present Illness  The patient is here for a follow-up visit. He has not been seen in some time. He also has an acute rash that he recently developed.    He reports a history of sensitive skin and believes he came into contact with poison ivy during a recent hiking trip near [place]. The rash, which is itchy but not painful, has been present since 04/04/2025. He notes that the rash appears to crystallize during the day and oozes clear fluid at night. He has been managing the rash by cleaning it twice daily. This is his first encounter with poison ivy. He has previously used steroids for other conditions but not for this specific issue.    His blood pressure was initially elevated, which he attributes to anxiety associated with doctor visits. He does not monitor his blood pressure outside of these visits.    He is currently on Lexapro 20 mg for anxiety management and requires a refill. He reports no desire to discontinue the medication, having previously experienced withdrawal symptoms such as nausea and irritability when he abruptly stopped the medication. He is currently experiencing significant stress due to his occupation as a .        ROS  Review of Systems   Constitutional:  Negative for activity change, appetite change, fever and unexpected weight change.   HENT:  Negative for congestion, dental problem, facial swelling and sore throat.    Eyes:  Negative for pain, discharge and itching.   Respiratory:  Negative for shortness of breath and wheezing.